# Patient Record
Sex: FEMALE | Race: WHITE | NOT HISPANIC OR LATINO | Employment: FULL TIME | URBAN - METROPOLITAN AREA
[De-identification: names, ages, dates, MRNs, and addresses within clinical notes are randomized per-mention and may not be internally consistent; named-entity substitution may affect disease eponyms.]

---

## 2017-02-15 NOTE — PROGRESS NOTES
SUBJECTIVE:     CC: Tri Mejia is an 46 year old woman who presents for preventive health visit.     Healthy Habits:    Do you get at least three servings of calcium containing foods daily (dairy, green leafy vegetables, etc.)? yes    Amount of exercise or daily activities, outside of work: 7 day(s) per week    Problems taking medications regularly No    Medication side effects: No    Have you had an eye exam in the past two years? yes    Do you see a dentist twice per year? yes    Do you have sleep apnea, excessive snoring or daytime drowsiness?no        cheek pain; She reports she has been having some pain around the sinuses, she was initially having tearing of the eyes , saw the  doctor , also had some dental procedure last week , but this more on her cheek      Asthma has been doing well , no concerns , request refill     Today's PHQ-2 Score:   PHQ-2 ( 1999 [employer]) 8/1/2016 2/16/2016   Q1: Little interest or pleasure in doing things 0 0   Q2: Feeling down, depressed or hopeless 0 0   PHQ-2 Score 0 0       Abuse: Current or Past(Physical, Sexual or Emotional)- No  Do you feel safe in your environment - Yes    Social History   Substance Use Topics     Smoking status: Former Smoker     Quit date: 4/26/2005     Smokeless tobacco: Never Used     Alcohol use Yes      Comment:  a couple of beers weeklly     The patient does not drink >3 drinks per day nor >7 drinks per week.    Recent Labs   Lab Test  04/29/15   0857  08/26/13   0848   CHOL  216*  208*   HDL  60  53   LDL  142*  144*   TRIG  71  56   CHOLHDLRATIO  3.6  4.0       Reviewed orders with patient.  Reviewed health maintenance and updated orders accordingly - Yes    Mammo Decision Support:  Patient under age 50, mutual decision reflected in health maintenance.      Pertinent mammograms are reviewed under the imaging tab.  History of abnormal Pap smear:   NO - age 30-65 PAP every 5 years with negative HPV co-testing recommended  Last 3 Pap  Results:   PAP (no units)   Date Value   04/29/2015 NIL   03/20/2012 NIL   03/25/2011 NIL     All Histories reviewed and updated in Epic.  Past Medical History   Diagnosis Date     Allergic rhinitis, cause unspecified      Seasonal     Depressive disorder, not elsewhere classified      PVC (premature ventricular contraction)      RECTAL & ANAL HEMORRHAGE 5/10/2007     Unspecified asthma(493.90) 1995      Past Surgical History   Procedure Laterality Date     Hc removal of tonsils,<11 y/o  1976     Tonsils <12y.o.     Hc hysteroscopy w endometrial bx/polypectomy w/wo d&c  1/19/2007     Hysteroscopy, D&C     Colonoscopy  05/21/2007     Sm internal hemorrhoids, otherwise normal. Repeat age 50.     Esophagoscopy, gastroscopy, duodenoscopy (egd), combined  10/3/2011     Procedure:COMBINED ESOPHAGOSCOPY, GASTROSCOPY, DUODENOSCOPY (EGD), BIOPSY SINGLE OR MULTIPLE; esophagogastroduodenoscopy with biopsy and dilatation, MULTIPLE BX; Surgeon:CHARY MORAN; [facility]     Esophagoscopy, gastroscopy, duodenoscopy (egd), dilatation, combined  10/3/2011     Procedure:COMBINED ESOPHAGOSCOPY, GASTROSCOPY, DUODENOSCOPY (EGD), DILATATION; Surgeon:CHARY MORAN; Location:[facility]       ROS:      Problem list, Medication list, Allergies, and Medical/Social/Surgical histories reviewed in EPIC and updated as appropriate.  Labs reviewed in EPIC  BP Readings from Last 3 Encounters:   02/21/17 116/72   08/01/16 104/60   03/08/16 107/62    Wt Readings from Last 3 Encounters:   02/21/17 146 lb 12.8 oz (66.6 kg)   08/01/16 146 lb (66.2 kg)   02/16/16 146 lb 12.8 oz (66.6 kg)                  Patient Active Problem List   Diagnosis     Allergic rhinitis     Palpitations     Excessive or frequent menstruation     Hemorrhage of rectum and anus     CARDIOVASCULAR SCREENING; LDL GOAL LESS THAN 160     GERD (gastroesophageal reflux disease)     Intermittent asthma     Pelvic pain in female     Past Surgical History   Procedure Laterality  Date     Hc removal of tonsils,<11 y/o  1976     Tonsils <12y.o.     Hc hysteroscopy w endometrial bx/polypectomy w/wo d&c  2007     Hysteroscopy, D&C     Colonoscopy  2007     Sm internal hemorrhoids, otherwise normal. Repeat age 50.     Esophagoscopy, gastroscopy, duodenoscopy (egd), combined  10/3/2011     Procedure:COMBINED ESOPHAGOSCOPY, GASTROSCOPY, DUODENOSCOPY (EGD), BIOPSY SINGLE OR MULTIPLE; esophagogastroduodenoscopy with biopsy and dilatation, MULTIPLE BX; Surgeon:CHARY MORAN; Location[facility]     Esophagoscopy, gastroscopy, duodenoscopy (egd), dilatation, combined  10/3/2011     Procedure:COMBINED ESOPHAGOSCOPY, GASTROSCOPY, DUODENOSCOPY (EGD), DILATATION; Surgeon:CHARY MORAN; Location[facility]       Social History   Substance Use Topics     Smoking status: Former Smoker     Quit date: 2005     Smokeless tobacco: Never Used     Alcohol use Yes      Comment:  a couple of beers weeklly     Family History   Problem Relation Age of Onset     Lipids Mother      Connective Tissue Disorder Mother      osteopenia     Hypertension Father      Arthritis Father      Lipids Father      CEREBROVASCULAR DISEASE Paternal Grandmother      CEREBROVASCULAR DISEASE Paternal Grandfather      Depression Sister      Depression Brother      Lipids Sister      C.A.D. Sister       of MI at 42. obese and developmental delay         Current Outpatient Prescriptions   Medication Sig Dispense Refill     albuterol (PROAIR HFA/PROVENTIL HFA/VENTOLIN HFA) 108 (90 BASE) MCG/ACT Inhaler Inhale 2 puffs into the lungs every 6 hours as needed for shortness of breath / dyspnea or wheezing 1 Inhaler 11     fluticasone (FLONASE) 50 MCG/ACT spray Spray 1-2 sprays into both nostrils daily 16 g 11     Cetirizine HCl (ZYRTEC PO)        Cholecalciferol (VITAMIN D3 PO) Take by mouth daily       Omeprazole (PRILOSEC PO) Take by mouth daily       Multiple Vitamin (MULTI-VITAMIN DAILY PO) Take  by mouth.        "[DISCONTINUED] albuterol (PROAIR HFA, PROVENTIL HFA, VENTOLIN HFA) 108 (90 BASE) MCG/ACT inhaler Inhale 2 puffs into the lungs every 6 hours as needed for shortness of breath / dyspnea or wheezing (Patient not taking: Reported on 2/21/2017) 1 Inhaler 11     Allergies   Allergen Reactions     Sulfa Drugs Hives     bactrim     OBJECTIVE:     /72 (BP Location: Right arm)  Pulse 82  Temp 98.7  F (37.1  C) (Temporal)  Resp 14  Ht 5' 1.75\" (1.568 m)  Wt 146 lb 12.8 oz (66.6 kg)  LMP 02/07/2017 (Approximate)  Breastfeeding? No  BMI 27.07 kg/m2  EXAM:  GENERAL: healthy, alert and no distress  EYES: Eyes grossly normal to inspection, PERRL and conjunctivae and sclerae normal  HENT: ear canals and TM's normal, nose and mouth without ulcers or lesions  NECK: no adenopathy, no asymmetry, masses, or scars and thyroid normal to palpation  RESP: lungs clear to auscultation - no rales, rhonchi or wheezes  BREAST: normal without masses, tenderness or nipple discharge and no palpable axillary masses or adenopathy  CV: regular rate and rhythm, normal S1 S2, no S3 or S4, no murmur, click or rub, no peripheral edema and peripheral pulses strong  ABDOMEN: soft, nontender, no hepatosplenomegaly, no masses and bowel sounds normal   (female): normal female external genitalia, normal urethral meatus, vaginal mucosa pink, moist, well rugated, and normal cervix/adnexa/uterus without masses or discharge  MS: no gross musculoskeletal defects noted, no edema  SKIN: no suspicious lesions or rashes  NEURO: Normal strength and tone, mentation intact and speech normal  PSYCH: mentation appears normal, affect normal/bright    ASSESSMENT/PLAN:         ICD-10-CM    1. Routine general medical examination at a health care facility Z00.00 Lipid panel reflex to direct LDL     GLUCOSE     Hemoglobin A1c   2. Intermittent asthma, uncomplicated J45.20 albuterol (PROAIR HFA/PROVENTIL HFA/VENTOLIN HFA) 108 (90 BASE) MCG/ACT Inhaler   3. Dysfunction of " "eustachian tube, bilateral H69.83 fluticasone (FLONASE) 50 MCG/ACT spray   4. Encounter for screening mammogram for breast cancer Z12.31 *MA Screening Digital Bilateral     Discussed with patient , suspects she may be grinding her teeth, will recommend trail of mouth guard   COUNSELING:   Reviewed preventive health counseling, as reflected in patient instructions         reports that she quit smoking about 11 years ago. She has never used smokeless tobacco.    Estimated body mass index is 27.07 kg/(m^2) as calculated from the following:    Height as of this encounter: 5' 1.75\" (1.568 m).    Weight as of this encounter: 146 lb 12.8 oz (66.6 kg).       Counseling Resources:  ATP IV Guidelines  Pooled Cohorts Equation Calculator  Breast Cancer Risk Calculator  FRAX Risk Assessment  ICSI Preventive Guidelines  Dietary Guidelines for Americans, 2010  USDA's MyPlate  ASA Prophylaxis  Lung CA Screening    Shyann West MD, MD  [facility]  "

## 2017-02-21 ENCOUNTER — OFFICE VISIT (OUTPATIENT)
Dept: FAMILY MEDICINE | Facility: OTHER | Age: 47
End: 2017-02-21
Payer: COMMERCIAL

## 2017-02-21 VITALS
TEMPERATURE: 98.7 F | HEIGHT: 62 IN | RESPIRATION RATE: 14 BRPM | BODY MASS INDEX: 27.02 KG/M2 | SYSTOLIC BLOOD PRESSURE: 116 MMHG | DIASTOLIC BLOOD PRESSURE: 72 MMHG | HEART RATE: 82 BPM | WEIGHT: 146.8 LBS

## 2017-02-21 DIAGNOSIS — Z00.00 ROUTINE GENERAL MEDICAL EXAMINATION AT A HEALTH CARE FACILITY: Primary | ICD-10-CM

## 2017-02-21 DIAGNOSIS — Z12.31 ENCOUNTER FOR SCREENING MAMMOGRAM FOR BREAST CANCER: ICD-10-CM

## 2017-02-21 DIAGNOSIS — H69.93 DYSFUNCTION OF EUSTACHIAN TUBE, BILATERAL: ICD-10-CM

## 2017-02-21 DIAGNOSIS — J45.20 INTERMITTENT ASTHMA, UNCOMPLICATED: ICD-10-CM

## 2017-02-21 LAB
CHOLEST SERPL-MCNC: 205 MG/DL
GLUCOSE SERPL-MCNC: 100 MG/DL (ref 70–99)
HBA1C MFR BLD: 5.4 % (ref 4.3–6)
HDLC SERPL-MCNC: 55 MG/DL
LDLC SERPL CALC-MCNC: 129 MG/DL
NONHDLC SERPL-MCNC: 150 MG/DL
TRIGL SERPL-MCNC: 106 MG/DL

## 2017-02-21 PROCEDURE — 80061 LIPID PANEL: CPT | Performed by: FAMILY MEDICINE

## 2017-02-21 PROCEDURE — 83036 HEMOGLOBIN GLYCOSYLATED A1C: CPT | Performed by: FAMILY MEDICINE

## 2017-02-21 PROCEDURE — 99396 PREV VISIT EST AGE 40-64: CPT | Performed by: FAMILY MEDICINE

## 2017-02-21 PROCEDURE — 36415 COLL VENOUS BLD VENIPUNCTURE: CPT | Performed by: FAMILY MEDICINE

## 2017-02-21 PROCEDURE — 82947 ASSAY GLUCOSE BLOOD QUANT: CPT | Performed by: FAMILY MEDICINE

## 2017-02-21 RX ORDER — ALBUTEROL SULFATE 90 UG/1
2 AEROSOL, METERED RESPIRATORY (INHALATION) EVERY 6 HOURS PRN
Qty: 1 INHALER | Refills: 11 | Status: SHIPPED | OUTPATIENT
Start: 2017-02-21 | End: 2018-03-21

## 2017-02-21 RX ORDER — FLUTICASONE PROPIONATE 50 MCG
1-2 SPRAY, SUSPENSION (ML) NASAL DAILY
Qty: 16 G | Refills: 11 | Status: SHIPPED | OUTPATIENT
Start: 2017-02-21 | End: 2018-10-09

## 2017-02-21 ASSESSMENT — PAIN SCALES - GENERAL: PAINLEVEL: MILD PAIN (2)

## 2017-02-21 NOTE — LETTER
"[facility]  [address] [address] [address]  Phone: [phone]    February 22, 2017    Tri Mejia  [id number] [facility] [address]          Dear Ms. Mejia,    I am writing to inform you of the results of the laboratory tests you had done recently.   Results for orders placed or performed in visit on 02/21/17   Lipid panel reflex to direct LDL   Result Value Ref Range    Cholesterol 205 (H) <200 mg/dL    Triglycerides 106 <150 mg/dL    HDL Cholesterol 55 >49 mg/dL    LDL Cholesterol Calculated 129 (H) <100 mg/dL    Non HDL Cholesterol 150 (H) <130 mg/dL   GLUCOSE   Result Value Ref Range    Glucose 100 (H) 70 - 99 mg/dL   Hemoglobin A1c   Result Value Ref Range    Hemoglobin A1C 5.4 4.3 - 6.0 %       HDL is the \"good\" cholesterol and when it is high (over 60), it decreases the risk for heart attack and stroke. When the HDL is less than 40, it increases the risk for these problems. The HDL can be raised by regular exercise and sometimes medications, such as niacin.    LDL is the \"bad\" cholesterol linked to heart disease and stroke. For those who have heart disease or diabetes, their LDL should be less than 100. For most everyone else, the LDL should be less than 130. For those with no risk factors, under 160 is acceptable.    Your triglycerides should be less than 150. These can be lowered with a low fat diet, reducing alcohol use, losing weight and, for those with diabetes, by improving blood sugar control.    Slight variations and daily fluctuations are normal and should cause little concern. As you know, an elevated cholesterol is one factor in increasing your risk of heart disease or stroke. You can improve your cholesterol by controlling the amount and type of fat you eat and by increasing your daily activity level.    Based on these results     Please follow these recommendations:  Please start or continue with a low fat diet ( low cholesterol)  Begin or continue " "a regular aerobic exercise program.  Increase frequency of exercise. ( This is the BEST way to increase \"good\" / HDL portion of cholesterol)  Your glucose level is slightly above goal. Can try to prevent this from getting worse by exercising regularly and controlling weight and diet.    Sincerely,      Shyann West MD            "

## 2017-02-21 NOTE — NURSING NOTE
"Chief Complaint   Patient presents with     Physical     Panel Management     MyChart, Flu shot, Lipids, aap, act       Initial /72 (BP Location: Right arm)  Pulse 82  Temp 98.7  F (37.1  C) (Temporal)  Resp 14  Ht 5' 1.75\" (1.568 m)  Wt 146 lb 12.8 oz (66.6 kg)  LMP 02/07/2017 (Approximate)  Breastfeeding? No  BMI 27.07 kg/m2 Estimated body mass index is 27.07 kg/(m^2) as calculated from the following:    Height as of this encounter: 5' 1.75\" (1.568 m).    Weight as of this encounter: 146 lb 12.8 oz (66.6 kg).  Medication Reconciliation: complete     Shelli Kaur MA    "

## 2017-02-21 NOTE — MR AVS SNAPSHOT
After Visit Summary   2/21/2017    Tri Mejia    MRN: [id number]           Patient Information     Date Of Birth          1970        Visit Information        Provider Department      2/21/2017 7:40 AM Shyann West MD [facility]        Today's Diagnoses     Routine general medical examination at a health care facility    -  1    Intermittent asthma, uncomplicated        Dysfunction of eustachian tube, bilateral          Care Instructions      Preventive Health Recommendations  Female Ages 40 to 49    Yearly exam:     See your health care provider every year in order to  1. Review health changes.   2. Discuss preventive care.    3. Review your medicines if your doctor prescribed any.      Get a Pap test every three years (unless you have an abnormal result and your provider advises testing more often).      If you get Pap tests with HPV test, you only need to test every 5 years, unless you have an abnormal result. You do not need a Pap test if your uterus was removed (hysterectomy) and you have not had cancer.      You should be tested each year for STDs (sexually transmitted diseases), if you're at risk.       Ask your doctor if you should have a mammogram.      Have a colonoscopy (test for colon cancer) if someone in your family has had colon cancer or polyps before age 50.       Have a cholesterol test every 5 years.       Have a diabetes test (fasting glucose) after age 45. If you are at risk for diabetes, you should have this test every 3 years.    Shots: Get a flu shot each year. Get a tetanus shot every 10 years.     Nutrition:     Eat at least 5 servings of fruits and vegetables each day.    Eat whole-grain bread, whole-wheat pasta and brown rice instead of white grains and rice.    Talk to your provider about Calcium and Vitamin D.     Lifestyle    Exercise at least 150 minutes a week (an average of 30 minutes a day, 5 days a week). This will help you  "control your weight and prevent disease.    Limit alcohol to one drink per day.    No smoking.     Wear sunscreen to prevent skin cancer.    See your dentist every six months for an exam and cleaning.        Follow-ups after your visit        Who to contact     If you have questions or need follow up information about today's clinic visit or your schedule please contact [facility] directly at [phone].  Normal or non-critical lab and imaging results will be communicated to you by MyChart, letter or phone within 4 business days after the clinic has received the results. If you do not hear from us within 7 days, please contact the clinic through IM5hart or phone. If you have a critical or abnormal lab result, we will notify you by phone as soon as possible.  Submit refill requests through ClickDelivery or call your pharmacy and they will forward the refill request to us. Please allow 3 business days for your refill to be completed.          Additional Information About Your Visit        [email or web address] Information     ClickDelivery lets you send messages to your doctor, view your test results, renew your prescriptions, schedule appointments and more. To sign up, go to [email or web address] . Click on \"Log in\" on the left side of the screen, which will take you to the Welcome page. Then click on \"Sign up Now\" on the right side of the page.     You will be asked to enter the access code listed below, as well as some personal information. Please follow the directions to create your username and password.     Your access code is: [email or web address]  Expires: 2017  8:02 AM     Your access code will  in 90 days. If you need help or a new code, please call your [place] clinic or [phone].        Care EveryWhere ID     This is your Care EveryWhere ID. This could be used by other organizations to access your [place] medical records  [id number]        Your Vitals Were     Pulse Temperature Respirations Height " "Last Period Breastfeeding?    82 98.7  F (37.1  C) (Temporal) 14 5' 1.75\" (1.568 m) 02/07/2017 (Approximate) No    BMI (Body Mass Index)                   27.07 kg/m2            Blood Pressure from Last 3 Encounters:   02/21/17 116/72   08/01/16 104/60   03/08/16 107/62    Weight from Last 3 Encounters:   02/21/17 146 lb 12.8 oz (66.6 kg)   08/01/16 146 lb (66.2 kg)   02/16/16 146 lb 12.8 oz (66.6 kg)              We Performed the Following     Asthma Action Plan (AAP)     GLUCOSE     Hemoglobin A1c     Lipid panel reflex to direct LDL          Where to get your medicines      These medications were sent to [id number] - [place], [place] - [address] [address]  [address] [address] [place] [place] [facility] [address]     Phone:  [phone]     albuterol 108 (90 BASE) MCG/ACT Inhaler    fluticasone 50 MCG/ACT spray          Primary Care Provider Office Phone # Fax #    Shyann West [phone]       [facility] [address] [place] [address]        Thank you!     Thank you for choosing [facility]  for your care. Our goal is always to provide you with excellent care. Hearing back from our patients is one way we can continue to improve our services. Please take a few minutes to complete the written survey that you may receive in the mail after your visit with us. Thank you!             Your Updated Medication List - Protect others around you: Learn how to safely use, store and throw away your medicines at www.disposemymeds.org.          This list is accurate as of: 2/21/17  8:02 AM.  Always use your most recent med list.                   Brand Name Dispense Instructions for use    albuterol 108 (90 BASE) MCG/ACT Inhaler    PROAIR HFA/PROVENTIL HFA/VENTOLIN HFA    1 Inhaler    Inhale 2 puffs into the lungs every 6 hours as needed for shortness of breath / dyspnea or wheezing       fluticasone 50 MCG/ACT spray    FLONASE    16 g    Spray 1-2 sprays into both nostrils daily    "    MULTI-VITAMIN DAILY PO      Take  by mouth.       PRILOSEC PO      Take by mouth daily       VITAMIN D3 PO      Take by mouth daily       ZYRTEC PO

## 2017-02-22 ASSESSMENT — ASTHMA QUESTIONNAIRES: ACT_TOTALSCORE: 23

## 2017-03-07 ENCOUNTER — HOSPITAL ENCOUNTER (OUTPATIENT)
Dept: MAMMOGRAPHY | Facility: CLINIC | Age: 47
Discharge: HOME | End: 2017-03-07
Attending: FAMILY MEDICINE | Admitting: FAMILY MEDICINE
Payer: COMMERCIAL

## 2017-03-07 DIAGNOSIS — Z12.31 VISIT FOR SCREENING MAMMOGRAM: ICD-10-CM

## 2017-03-07 PROCEDURE — G0202 SCR MAMMO BI INCL CAD: HCPCS

## 2017-07-16 ENCOUNTER — OFFICE VISIT (OUTPATIENT)
Dept: URGENT CARE | Facility: RETAIL CLINIC | Age: 47
End: 2017-07-16
Payer: COMMERCIAL

## 2017-07-16 VITALS
WEIGHT: 145 LBS | DIASTOLIC BLOOD PRESSURE: 77 MMHG | BODY MASS INDEX: 26.74 KG/M2 | OXYGEN SATURATION: 97 % | SYSTOLIC BLOOD PRESSURE: 129 MMHG | RESPIRATION RATE: 12 BRPM | TEMPERATURE: 97.8 F | HEART RATE: 60 BPM

## 2017-07-16 DIAGNOSIS — H65.02 ACUTE SEROUS OTITIS MEDIA OF LEFT EAR, RECURRENCE NOT SPECIFIED: Primary | ICD-10-CM

## 2017-07-16 PROCEDURE — 99213 OFFICE O/P EST LOW 20 MIN: CPT | Performed by: PHYSICIAN ASSISTANT

## 2017-07-16 ASSESSMENT — PAIN SCALES - GENERAL: PAINLEVEL: MODERATE PAIN (5)

## 2017-07-16 NOTE — MR AVS SNAPSHOT
"              After Visit Summary   2017    Tri Mejia    MRN: [id number]           Patient Information     Date Of Birth          1970        Visit Information        Provider Department      2017 12:50 PM Violette Yeboah PA-C [facility]        Today's Diagnoses     Acute serous otitis media of left ear, recurrence not specified    -  1      Care Instructions    Continue Zyrtec daily.  Use Flonase as directed- 2 sprays in each nostril daily until symptoms resolve then continue with 1 spray in each nostril daily for 5 more days.  Sudafed behind the pharmacist counter helps to dry up fluid in ears.  Ibuprofen as needed for pain relief and to reduce inflammation.  Warm compresses next to ear for pain relief.  Drink plenty of fluids and place a humidifier in bedroom.  +/- Mucinex (guiafenasin) to help fluid in ears to drain.  Follow up with primary care provider in 10-14 days with any concern of persistent pain.          Follow-ups after your visit        Who to contact     You can reach your care team any time of the day by calling [phone].  Notification of test results:  If you have an abnormal lab result, we will notify you by phone as soon as possible.         Additional Information About Your Visit        MyChart Information     [employer] lets you send messages to your doctor, view your test results, renew your prescriptions, schedule appointments and more. To sign up, go to [email or web address] . Click on \"Log in\" on the left side of the screen, which will take you to the Welcome page. Then click on \"Sign up Now\" on the right side of the page.     You will be asked to enter the access code listed below, as well as some personal information. Please follow the directions to create your username and password.     Your access code is: [email or web address]  Expires: 10/14/2017 12:51 PM     Your access code will  in 90 days. If you need help or a new code, please " call your [facility] or [phone].        Care EveryWhere ID     This is your Care EveryWhere ID. This could be used by other organizations to access your [place] medical records  [id number]        Your Vitals Were     Pulse Temperature Respirations Pulse Oximetry BMI (Body Mass Index)       60 97.8  F (36.6  C) (Tympanic) 12 97% 26.74 kg/m2        Blood Pressure from Last 3 Encounters:   07/16/17 129/77   02/21/17 116/72   08/01/16 104/60    Weight from Last 3 Encounters:   07/16/17 145 lb (65.8 kg)   02/21/17 146 lb 12.8 oz (66.6 kg)   08/01/16 146 lb (66.2 kg)              Today, you had the following     No orders found for display       Primary Care Provider Office Phone # Fax #    Shyann Lynne West [phone]       [facility] [address] [place] [address]        Equal Access to Services     [facility]: Hadii aad ku hadasho Soomaali, waaxda luqadaha, qaybta kaalmada adeegyada, waxay idiin hayevern eliecer pinto . So [facility] [phone].    ATENCIÓN: Si habla español, tiene a renteria disposición servicios gratuitos de asistencia lingüística. Llame al [phone].    We comply with applicable federal civil rights laws and [place] laws. We do not discriminate on the basis of race, color, national origin, age, disability sex, sexual orientation or gender identity.            Thank you!     Thank you for choosing [facility]  for your care. Our goal is always to provide you with excellent care. Hearing back from our patients is one way we can continue to improve our services. Please take a few minutes to complete the written survey that you may receive in the mail after your visit with us. Thank you!             Your Updated Medication List - Protect others around you: Learn how to safely use, store and throw away your medicines at www.disposemymeds.org.          This list is accurate as of: 7/16/17 12:51 PM.  Always use your most recent med  list.                   Brand Name Dispense Instructions for use Diagnosis    albuterol 108 (90 BASE) MCG/ACT Inhaler    PROAIR HFA/PROVENTIL HFA/VENTOLIN HFA    1 Inhaler    Inhale 2 puffs into the lungs every 6 hours as needed for shortness of breath / dyspnea or wheezing    Intermittent asthma, uncomplicated       fluticasone 50 MCG/ACT spray    FLONASE    16 g    Spray 1-2 sprays into both nostrils daily    Dysfunction of eustachian tube, bilateral       MULTI-VITAMIN DAILY PO      Take  by mouth.        PRILOSEC PO      Take by mouth daily        VITAMIN D3 PO      Take by mouth daily        ZYRTEC PO

## 2017-07-16 NOTE — NURSING NOTE
"Chief Complaint   Patient presents with     Ear Problem     Left ear for about a week now       Initial /77  Pulse 60  Temp 97.8  F (36.6  C) (Tympanic)  Resp 12  Wt 145 lb (65.8 kg)  SpO2 97%  BMI 26.74 kg/m2 Estimated body mass index is 26.74 kg/(m^2) as calculated from the following:    Height as of 2/21/17: 5' 1.75\" (1.568 m).    Weight as of this encounter: 145 lb (65.8 kg).  Medication Reconciliation: complete    "

## 2017-07-16 NOTE — PROGRESS NOTES
Chief Complaint   Patient presents with     Ear Problem     Left ear for about a week now     SUBJECTIVE:  Tri Mejia is a 47 year old female who presents for evaluation of left ear pain and fullness for 1 week.   Severity: mild and moderate   Timing: gradual onset  Additional symptoms include none.  Treatment measures tried include: OTC meds.  History of recurrent otitis: no  Predisposing factors include: None.    Past Medical History:   Diagnosis Date     Allergic rhinitis, cause unspecified     Seasonal     Depressive disorder, not elsewhere classified      PVC (premature ventricular contraction)      RECTAL & ANAL HEMORRHAGE 5/10/2007     Unspecified asthma(493.90) 1995     Current Outpatient Prescriptions   Medication Sig Dispense Refill     albuterol (PROAIR HFA/PROVENTIL HFA/VENTOLIN HFA) 108 (90 BASE) MCG/ACT Inhaler Inhale 2 puffs into the lungs every 6 hours as needed for shortness of breath / dyspnea or wheezing 1 Inhaler 11     fluticasone (FLONASE) 50 MCG/ACT spray Spray 1-2 sprays into both nostrils daily 16 g 11     Cetirizine HCl (ZYRTEC PO)        Cholecalciferol (VITAMIN D3 PO) Take by mouth daily       Omeprazole (PRILOSEC PO) Take by mouth daily       Multiple Vitamin (MULTI-VITAMIN DAILY PO) Take  by mouth.       Social History   Substance Use Topics     Smoking status: Former Smoker     Quit date: 4/26/2005     Smokeless tobacco: Never Used     Alcohol use Yes      Comment:  a couple of beers weeklly     Allergies   Allergen Reactions     Sulfa Drugs Hives     bactrim     ROS:   Review of systems negative except as stated above.    OBJECTIVE:  /77  Pulse 60  Temp 97.8  F (36.6  C) (Tympanic)  Resp 12  Wt 145 lb (65.8 kg)  SpO2 97%  BMI 26.74 kg/m2   GENERAL: awake alert and in no acute distress  EARS:   Right TM in neutral position, translucent without scarring, effusion or erythema. Normal landmarks are visible. Auditory canal without drainage, edema, erythema.  ENT: EOMI,   PERRL, conjunctiva clear. Nares bilaterally without edematous turbinates or discharge. Posterior pharynx is not erythematous.  NECK: supple, non-tender to palpation, no adenopathy noted.   RESP: lungs clear to auscultation - no rales, rhonchi or wheezes  CV: regular rates and rhythm, normal S1 S2, no murmur noted    ASSESSMENT:    ICD-10-CM    1. Acute serous otitis media of left ear, recurrence not specified H65.02      PLAN:   Patient Instructions   Continue Zyrtec daily.  Use Flonase as directed- 2 sprays in each nostril daily until symptoms resolve then continue with 1 spray in each nostril daily for 5 more days.  Sudafed behind the pharmacist counter helps to dry up fluid in ears.  Ibuprofen as needed for pain relief and to reduce inflammation.  Warm compresses next to ear for pain relief.  Drink plenty of fluids and place a humidifier in bedroom.  +/- Mucinex (guiafenasin) to help fluid in ears to drain.  Follow up with primary care provider in 10-14 days with any concern of persistent pain.    Follow up with primary care provider with any problems, questions or concerns or if symptoms worsen or fail to improve. Patient agreed to plan and verbalized understanding.    Nazanin Yeboah PA-C  [facility] [place]

## 2017-07-16 NOTE — PATIENT INSTRUCTIONS
Continue Zyrtec daily.  Use Flonase as directed- 2 sprays in each nostril daily until symptoms resolve then continue with 1 spray in each nostril daily for 5 more days.  Sudafed behind the pharmacist counter helps to dry up fluid in ears.  Ibuprofen as needed for pain relief and to reduce inflammation.  Warm compresses next to ear for pain relief.  Drink plenty of fluids and place a humidifier in bedroom.  +/- Mucinex (guiafenasin) to help fluid in ears to drain.  Follow up with primary care provider in 10-14 days with any concern of persistent pain.

## 2018-03-14 NOTE — PROGRESS NOTES
SUBJECTIVE:   CC: Tri Mejia is an 47 year old woman who presents for preventive health visit.     Physical   Annual:     Getting at least 3 servings of Calcium per day::  Yes    Bi-annual eye exam::  Yes    Dental care twice a year::  Yes    Sleep apnea or symptoms of sleep apnea::  None    Diet::  Other    Frequency of exercise::  6-7 days/week    Duration of exercise::  15-30 minutes    Taking medications regularly::  Yes    Additional concerns today::  No                Today's PHQ-2 Score:   PHQ-2 ( 1999 [employer]) 3/21/2018   Q1: Little interest or pleasure in doing things 0   Q2: Feeling down, depressed or hopeless 0   PHQ-2 Score 0   Q1: Little interest or pleasure in doing things Not at all   Q2: Feeling down, depressed or hopeless Not at all   PHQ-2 Score 0       Abuse: Current or Past(Physical, Sexual or Emotional)- No  Do you feel safe in your environment - Yes    Social History   Substance Use Topics     Smoking status: Former Smoker     Quit date: 4/26/2005     Smokeless tobacco: Never Used     Alcohol use Yes      Comment:  a couple of beers weeklly     Alcohol Use 3/21/2018   If you drink alcohol do you typically have greater than 3 drinks per day OR greater than 7 drinks per week? No   No flowsheet data found.    Reviewed orders with patient.  Reviewed health maintenance and updated orders accordingly - Yes  Labs reviewed in EPIC    Patient under age 50, mutual decision reflected in health maintenance.      Pertinent mammograms are reviewed under the imaging tab.  History of abnormal Pap smear: NO - age 30-65 PAP every 5 years with negative HPV co-testing recommended    Reviewed and updated as needed this visit by clinical staff  Tobacco  Allergies  Med Hx  Surg Hx  Fam Hx  Soc Hx        Reviewed and updated as needed this visit by Provider        Past Medical History:   Diagnosis Date     Allergic rhinitis, cause unspecified     Seasonal     Depressive disorder, not elsewhere  classified      PVC (premature ventricular contraction)      RECTAL & ANAL HEMORRHAGE 5/10/2007     Unspecified asthma(493.90) 1995      Past Surgical History:   Procedure Laterality Date     COLONOSCOPY  05/21/2007    Sm internal hemorrhoids, otherwise normal. Repeat age 50.     ESOPHAGOSCOPY, GASTROSCOPY, DUODENOSCOPY (EGD), COMBINED  10/3/2011    Procedure:COMBINED ESOPHAGOSCOPY, GASTROSCOPY, DUODENOSCOPY (EGD), BIOPSY SINGLE OR MULTIPLE; esophagogastroduodenoscopy with biopsy and dilatation, MULTIPLE BX; Surgeon:CHARY MORAN; Location:[facility]     ESOPHAGOSCOPY, GASTROSCOPY, DUODENOSCOPY (EGD), DILATATION, COMBINED  10/3/2011    Procedure:COMBINED ESOPHAGOSCOPY, GASTROSCOPY, DUODENOSCOPY (EGD), DILATATION; Surgeon:CHARY MORAN; Location:[facility]     HC HYSTEROSCOPY W ENDOMETRIAL BX/POLYPECTOMY W/WO D&C  1/19/2007    Hysteroscopy, D&C     HC REMOVAL OF TONSILS,<11 Y/O  1976    Tonsils <12y.o.       Review of Systems  C: NEGATIVE for fever, chills, change in weight  I: NEGATIVE for worrisome rashes, moles or lesions  E: NEGATIVE for vision changes or irritation  ENT: NEGATIVE for ear, mouth and throat problems  R: NEGATIVE for significant cough or SOB  B: NEGATIVE for masses, tenderness or discharge  CV: NEGATIVE for chest pain, palpitations or peripheral edema  GI: NEGATIVE for nausea, abdominal pain, heartburn, or change in bowel habits  : NEGATIVE for unusual urinary or vaginal symptoms. Periods are regular.  M: NEGATIVE for significant arthralgias or myalgia  N: NEGATIVE for weakness, dizziness or paresthesias  P: NEGATIVE for changes in mood or affect     OBJECTIVE:   There were no vitals taken for this visit.  Physical Exam  GENERAL: healthy, alert and no distress  EYES: Eyes grossly normal to inspection, PERRL and conjunctivae and sclerae normal  HENT: ear canals and TM's normal, nose and mouth without ulcers or lesions  NECK: no adenopathy, no asymmetry, masses, or scars and thyroid normal to  "palpation  RESP: lungs clear to auscultation - no rales, rhonchi or wheezes  CV: regular rate and rhythm, normal S1 S2, no S3 or S4, no murmur, click or rub, no peripheral edema and peripheral pulses strong  ABDOMEN: soft, nontender, no hepatosplenomegaly, no masses and bowel sounds normal  MS: no gross musculoskeletal defects noted, no edema  SKIN: no suspicious lesions or rashes  NEURO: Normal strength and tone, mentation intact and speech normal  PSYCH: mentation appears normal, affect normal/bright    ASSESSMENT/PLAN:   1. Encounter for routine adult health examination without abnormal findings  See notes    2. Intermittent asthma, uncomplicated  Stable. Refills sent.  - albuterol (PROAIR HFA/PROVENTIL HFA/VENTOLIN HFA) 108 (90 BASE) MCG/ACT Inhaler; Inhale 2 puffs into the lungs every 6 hours as needed for shortness of breath / dyspnea or wheezing  Dispense: 1 Inhaler; Refill: 11    3. Visit for screening mammogram  - MA SCREENING DIGITAL BILAT - Future  (s+30); Future    COUNSELING:  Reviewed preventive health counseling, as reflected in patient instructions       Regular exercise       Healthy diet/nutrition         reports that she quit smoking about 12 years ago. She has never used smokeless tobacco.    Estimated body mass index is 26.74 kg/(m^2) as calculated from the following:    Height as of 2/21/17: 5' 1.75\" (1.568 m).    Weight as of 7/16/17: 145 lb (65.8 kg).   Weight management plan: Discussed healthy diet and exercise guidelines and patient will follow up in 12 months in clinic to re-evaluate.    Counseling Resources:  ATP IV Guidelines  Pooled Cohorts Equation Calculator  Breast Cancer Risk Calculator  FRAX Risk Assessment  ICSI Preventive Guidelines  Dietary Guidelines for Americans, 2010  USDA's MyPlate  ASA Prophylaxis  Lung CA Screening    Suad Dang, ROMELIA [facility]  "

## 2018-03-21 ENCOUNTER — OFFICE VISIT (OUTPATIENT)
Dept: FAMILY MEDICINE | Facility: OTHER | Age: 48
End: 2018-03-21
Payer: COMMERCIAL

## 2018-03-21 VITALS
SYSTOLIC BLOOD PRESSURE: 106 MMHG | HEIGHT: 62 IN | BODY MASS INDEX: 26.7 KG/M2 | RESPIRATION RATE: 16 BRPM | HEART RATE: 56 BPM | TEMPERATURE: 98 F | DIASTOLIC BLOOD PRESSURE: 66 MMHG | WEIGHT: 145.1 LBS

## 2018-03-21 DIAGNOSIS — Z12.31 VISIT FOR SCREENING MAMMOGRAM: ICD-10-CM

## 2018-03-21 DIAGNOSIS — Z00.00 ENCOUNTER FOR ROUTINE ADULT HEALTH EXAMINATION WITHOUT ABNORMAL FINDINGS: Primary | ICD-10-CM

## 2018-03-21 DIAGNOSIS — J45.20 INTERMITTENT ASTHMA, UNCOMPLICATED: ICD-10-CM

## 2018-03-21 PROCEDURE — 99396 PREV VISIT EST AGE 40-64: CPT | Performed by: STUDENT IN AN ORGANIZED HEALTH CARE EDUCATION/TRAINING PROGRAM

## 2018-03-21 RX ORDER — ALBUTEROL SULFATE 90 UG/1
2 AEROSOL, METERED RESPIRATORY (INHALATION) EVERY 6 HOURS PRN
Qty: 1 INHALER | Refills: 11 | Status: SHIPPED | OUTPATIENT
Start: 2018-03-21 | End: 2019-04-01

## 2018-03-21 ASSESSMENT — PAIN SCALES - GENERAL: PAINLEVEL: NO PAIN (0)

## 2018-03-21 NOTE — MR AVS SNAPSHOT
After Visit Summary   3/21/2018    Tri Mejia    MRN: [id number]           Patient Information     Date Of Birth          1970        Visit Information        Provider Department      3/21/2018 12:00 PM Suad Dang APRN [facility]        Today's Diagnoses     Mixed hyperlipidemia    -  1    Visit for screening mammogram        Need for prophylactic vaccination with tetanus-diphtheria (TD)        Intermittent asthma, uncomplicated          Care Instructions      Preventive Health Recommendations  Female Ages 40 to 49    Yearly exam:     See your health care provider every year in order to  1. Review health changes.   2. Discuss preventive care.    3. Review your medicines if your doctor prescribed any.      Get a Pap test every three years (unless you have an abnormal result and your provider advises testing more often).      If you get Pap tests with HPV test, you only need to test every 5 years, unless you have an abnormal result. You do not need a Pap test if your uterus was removed (hysterectomy) and you have not had cancer.      You should be tested each year for STDs (sexually transmitted diseases), if you're at risk.       Ask your doctor if you should have a mammogram.      Have a colonoscopy (test for colon cancer) if someone in your family has had colon cancer or polyps before age 50.       Have a cholesterol test every 5 years.       Have a diabetes test (fasting glucose) after age 45. If you are at risk for diabetes, you should have this test every 3 years.    Shots: Get a flu shot each year. Get a tetanus shot every 10 years.     Nutrition:     Eat at least 5 servings of fruits and vegetables each day.    Eat whole-grain bread, whole-wheat pasta and brown rice instead of white grains and rice.    Talk to your provider about Calcium and Vitamin D.     Lifestyle    Exercise at least 150 minutes a week (an average of 30 minutes a day, 5 days a  "week). This will help you control your weight and prevent disease.    Limit alcohol to one drink per day.    No smoking.     Wear sunscreen to prevent skin cancer.    See your dentist every six months for an exam and cleaning.          Follow-ups after your visit        Your next 10 appointments already scheduled     Mar 22, 2018 12:45 PM CDT   (Arrive by 12:30 PM)   MA SCREENING DIGITAL BILATERAL with PHMA1   [facility]    [address] [place] [id number] [phone]           Do not use any powder, lotion or deodorant under your arms or on your breast. If you do, we will ask you to remove it before your exam.  Wear comfortable, two-piece clothing.  If you have any allergies, tell your care team.  Bring any previous mammograms from other facilities or have them mailed to the breast center. Three-dimensional (3D) mammograms are available at [place] locations in [facility], [facility], and [place]. [facility] locations include [facility] in [place]. Benefits of 3D mammograms include: - Improved rate of cancer detection - Decreases your chance of having to go back for more tests, which means fewer: - \"False-positive\" results (This means that there is an abnormal area but it isn't cancer.) - Invasive testing procedures, such as a biopsy or surgery - Can provide clearer images of the breast if you have dense breast tissue. 3D mammography is an optional exam that anyone can have with a 2D mammogram. It doesn't replace or take the place of a 2D mammogram. 2D mammograms remain an effective screening test for all women.  Not all insurance companies cover the cost of a 3D mammogram. Check with your insurance.              Future tests that were ordered for you today     Open Future Orders        Priority Expected Expires Ordered    MA SCREENING DIGITAL BILAT - Future  (s+30) Routine  " "3/14/2019 3/21/2018            Who to contact     If you have questions or need follow up information about today's clinic visit or your schedule please contact [facility] directly at [phone].  Normal or non-critical lab and imaging results will be communicated to you by MyChart, letter or phone within 4 business days after the clinic has received the results. If you do not hear from us within 7 days, please contact the clinic through MyChart or phone. If you have a critical or abnormal lab result, we will notify you by phone as soon as possible.  Submit refill requests through [employer] or call your pharmacy and they will forward the refill request to us. Please allow 3 business days for your refill to be completed.          Additional Information About Your Visit        [employer] Information     [employer] lets you send messages to your doctor, view your test results, renew your prescriptions, schedule appointments and more. To sign up, go to [email or web address] . Click on \"Log in\" on the left side of the screen, which will take you to the Welcome page. Then click on \"Sign up Now\" on the right side of the page.     You will be asked to enter the access code listed below, as well as some personal information. Please follow the directions to create your username and password.     Your access code is: [email or web address]  Expires: 2018 12:23 PM     Your access code will  in 90 days. If you need help or a new code, please call your [place] clinic or [phone].        Care EveryWhere ID     This is your Care EveryWhere ID. This could be used by other organizations to access your [place] medical records  [id number]        Your Vitals Were     Pulse Temperature Respirations Height BMI (Body Mass Index)       56 98  F (36.7  C) (Temporal) 16 5' 1.73\" (1.568 m) 26.77 kg/m2        Blood Pressure from Last 3 Encounters:   18 106/66   17 129/77   17 116/72    Weight from Last 3 " Encounters:   03/21/18 145 lb 1.6 oz (65.8 kg)   07/16/17 145 lb (65.8 kg)   02/21/17 146 lb 12.8 oz (66.6 kg)              We Performed the Following     Asthma Action Plan (AAP)          Where to get your medicines      These medications were sent to [id number] - [place], [place] - [address]  [address] [address], [facility] [address]     Phone:  [phone]     albuterol 108 (90 BASE) MCG/ACT Inhaler          Primary Care Provider Office Phone # Fax #    Suad Dang, ROMELIA [facility] [phone] [address] [facility] [address]        Equal Access to Services     JOHN GOYAL : Hadii aad ku hadasho Soomaali, waaxda luqadaha, qaybta kaalmada adeegyada, maria dolores pinto . So [facility] [phone].    ATENCIÓN: Si habla español, tiene a renteria disposición servicios gratuitos de asistencia lingüística. [facility] [phone].    We comply with applicable federal civil rights laws and [place] laws. We do not discriminate on the basis of race, color, national origin, age, disability, sex, sexual orientation, or gender identity.            Thank you!     Thank you for choosing [facility]  for your care. Our goal is always to provide you with excellent care. Hearing back from our patients is one way we can continue to improve our services. Please take a few minutes to complete the written survey that you may receive in the mail after your visit with us. Thank you!             Your Updated Medication List - Protect others around you: Learn how to safely use, store and throw away your medicines at www.disposemymeds.org.          This list is accurate as of 3/21/18 12:23 PM.  Always use your most recent med list.                   Brand Name Dispense Instructions for use Diagnosis    albuterol 108 (90 BASE) MCG/ACT Inhaler    PROAIR HFA/PROVENTIL HFA/VENTOLIN HFA    1 Inhaler    Inhale 2 puffs into the lungs every 6 hours as needed for shortness of  breath / dyspnea or wheezing    Intermittent asthma, uncomplicated       fluticasone 50 MCG/ACT spray    FLONASE    16 g    Spray 1-2 sprays into both nostrils daily    Dysfunction of Eustachian tube, bilateral       MULTI-VITAMIN DAILY PO      Take  by mouth.        PRILOSEC PO      Take by mouth daily        VITAMIN D3 PO      Take by mouth daily        ZYRTEC PO

## 2018-03-22 ENCOUNTER — HOSPITAL ENCOUNTER (OUTPATIENT)
Dept: MAMMOGRAPHY | Facility: CLINIC | Age: 48
Discharge: HOME | End: 2018-03-22
Payer: COMMERCIAL

## 2018-03-22 DIAGNOSIS — Z12.31 VISIT FOR SCREENING MAMMOGRAM: ICD-10-CM

## 2018-03-22 PROCEDURE — 77063 BREAST TOMOSYNTHESIS BI: CPT

## 2018-03-22 ASSESSMENT — ASTHMA QUESTIONNAIRES: ACT_TOTALSCORE: 22

## 2018-10-08 NOTE — PROGRESS NOTES
SUBJECTIVE:   Tri Mejia is a 48 year old female who presents to clinic today for the following health issues:    HPI  URINARY TRACT SYMPTOMS  Onset: 1 week    Description:   Painful urination (Dysuria): YES  Blood in urine (Hematuria): no   Delay in urine (Hesitency): no     Intensity: mild    Progression of Symptoms:  same    Accompanying Signs & Symptoms:  Fever/chills: no   Flank pain no   Nausea and vomiting: no   Any vaginal symptoms: vaginal itching  Abdominal/Pelvic Pain: YES    History:   History of frequent UTI's: no   History of kidney stones: no   Sexually Active: no   Possibility of pregnancy: No    Precipitating factors:   None    Therapies Tried and outcome: OTC advil or tylenol     She started having symptoms over a week ago.  She had some itchiness of her bottom so he she tried some topical 1 day Monistat and felt that the symptoms worsened after she tried this.  She does have multiple allergies and sensitivities to detergents, soaps and other body care products.  She felt that her symptoms possibly had worsened due to use of Monistat and having a sensitivity to this.  She denies using any new soaps, lotions, detergents, lubricants or other personal care products. She is urinating every hour during the day.  At night she will urinate prior to bedtime and then shortly thereafter urinate one more time otherwise she will be up during the night to urinate.  She feels that after she urinates she has not completely emptied her bladder and could go  again shortly thereafter.  She is also feeling a heaviness and discomfort when she urinates.  The urinary frequency has been going on for quite some time but she has just dealt with it.  She came in today because the frequency and discomfort have been worsening over the past week.  She has not been sexually active for the past year.  She denies family history of diabetes.  Her fasting blood sugars the past couple times have been mildly elevated in  "2015 and 2017.    Problem list and histories reviewed & adjusted, as indicated.  Additional history: as documented    BP Readings from Last 3 Encounters:   10/09/18 118/64   03/21/18 106/66   07/16/17 129/77    Wt Readings from Last 3 Encounters:   10/09/18 142 lb 11.2 oz (64.7 kg)   03/21/18 145 lb 1.6 oz (65.8 kg)   07/16/17 145 lb (65.8 kg)                  Labs reviewed in EPIC    ROS:  Constitutional, HEENT, cardiovascular, pulmonary, GI, , musculoskeletal, neuro, skin, endocrine and psych systems are negative, except as otherwise noted.    OBJECTIVE:     /64  Pulse 52  Temp 98.7  F (37.1  C) (Temporal)  Resp 16  Ht 5' 1.73\" (1.568 m)  Wt 142 lb 11.2 oz (64.7 kg)  SpO2 100%  BMI 26.33 kg/m2  Body mass index is 26.33 kg/(m^2).  GENERAL:  alert and no acute distress  RESP: lungs clear to auscultation - no rales, rhonchi or wheezes  CV: regular rate and rhythm, normal S1 S2, no S3 or S4, no murmur, click or rub, no peripheral edema   ABDOMEN: soft, nontender, without hepatosplenomegaly or masses and bowel sounds normal   (female): normal female external genitalia, normal urethral meatus, vaginal mucosa, normal cervix/adnexa/uterus without masses or discharge, bladder feels distended to palpation on manual pelvic exam  MS: no gross musculoskeletal defects noted, no edema  SKIN: no suspicious lesions or rashes  NEURO: Normal strength and tone, mentation intact and speech normal  PSYCH: mentation appears normal, affect normal/bright    Diagnostic Test Results:  Results for orders placed or performed in visit on 10/09/18 (from the past 24 hour(s))   UA reflex to Microscopic and Culture   Result Value Ref Range    Color Urine Yellow     Appearance Urine Clear     Glucose Urine Negative NEG^Negative mg/dL    Bilirubin Urine Negative NEG^Negative    Ketones Urine Negative NEG^Negative mg/dL    Specific Gravity Urine <=1.005 1.003 - 1.035    Blood Urine Negative NEG^Negative    pH Urine 6.0 5.0 - 7.0 pH "    Protein Albumin Urine Negative NEG^Negative mg/dL    Urobilinogen Urine 0.2 0.2 - 1.0 EU/dL    Nitrite Urine Negative NEG^Negative    Leukocyte Esterase Urine Negative NEG^Negative    Source Unspecified Urine    Wet prep   Result Value Ref Range    Specimen Description Unspecified Urine     Wet Prep No Trichomonas seen     Wet Prep No clue cells seen     Wet Prep No yeast seen        ASSESSMENT/PLAN:     1. Incomplete bladder emptying  2. Urinary frequency  3. Dysuria  Patient's UA and wet prep are completely normal.  I did a pelvic exam which the external and internal genitalia appear normal.  Manual pelvic exam revealed distention over her bladder and patient had voided for urine sample within the past 10 minutes.  She will come in tomorrow for fasting labs to rule out diabetes given the urinary frequency however I feel the likelihood for this is probably low.  I recommended that she consult with urology for further workup.  - UA reflex to Microscopic and Culture  - Wet prep  - UROLOGY ADULT REFERRAL    4. Screening for malignant neoplasm of cervix  - Pap imaged thin layer screen with HPV - recommended age 30 - 65 years (select HPV order below)  - HPV High Risk Types DNA Cervical    5. Screening for diabetes mellitus  - **Basic metabolic panel FUTURE anytime; Future    6. Encounter for screening for cardiovascular disorders  - Lipid panel reflex to direct LDL Fasting; Future    7. Need for prophylactic vaccination and inoculation against influenza  - FLU VACCINE, SPLIT VIRUS, IM (QUADRIVALENT) [17100]- >3 YRS  - Vaccine Administration, Initial [62316]    Greater than 50% of 25 minute visit were spent on counseling or coordination of care regarding urinary frequency, dysuria, incomplete bladder emptying.     ROMELIA Aguayo [facility]

## 2018-10-09 ENCOUNTER — OFFICE VISIT (OUTPATIENT)
Dept: FAMILY MEDICINE | Facility: OTHER | Age: 48
End: 2018-10-09
Payer: COMMERCIAL

## 2018-10-09 VITALS
SYSTOLIC BLOOD PRESSURE: 118 MMHG | HEART RATE: 52 BPM | HEIGHT: 62 IN | DIASTOLIC BLOOD PRESSURE: 64 MMHG | BODY MASS INDEX: 26.26 KG/M2 | RESPIRATION RATE: 16 BRPM | WEIGHT: 142.7 LBS | OXYGEN SATURATION: 100 % | TEMPERATURE: 98.7 F

## 2018-10-09 DIAGNOSIS — Z13.1 SCREENING FOR DIABETES MELLITUS: ICD-10-CM

## 2018-10-09 DIAGNOSIS — Z23 NEED FOR PROPHYLACTIC VACCINATION AND INOCULATION AGAINST INFLUENZA: ICD-10-CM

## 2018-10-09 DIAGNOSIS — R35.0 URINARY FREQUENCY: ICD-10-CM

## 2018-10-09 DIAGNOSIS — R30.0 DYSURIA: ICD-10-CM

## 2018-10-09 DIAGNOSIS — R33.9 INCOMPLETE BLADDER EMPTYING: Primary | ICD-10-CM

## 2018-10-09 DIAGNOSIS — Z13.6 ENCOUNTER FOR SCREENING FOR CARDIOVASCULAR DISORDERS: ICD-10-CM

## 2018-10-09 DIAGNOSIS — Z12.4 SCREENING FOR MALIGNANT NEOPLASM OF CERVIX: ICD-10-CM

## 2018-10-09 LAB
ALBUMIN UR-MCNC: NEGATIVE MG/DL
APPEARANCE UR: CLEAR
BILIRUB UR QL STRIP: NEGATIVE
COLOR UR AUTO: YELLOW
GLUCOSE UR STRIP-MCNC: NEGATIVE MG/DL
HGB UR QL STRIP: NEGATIVE
KETONES UR STRIP-MCNC: NEGATIVE MG/DL
LEUKOCYTE ESTERASE UR QL STRIP: NEGATIVE
NITRATE UR QL: NEGATIVE
PH UR STRIP: 6 PH (ref 5–7)
SOURCE: NORMAL
SP GR UR STRIP: <=1.005 (ref 1–1.03)
SPECIMEN SOURCE: NORMAL
UROBILINOGEN UR STRIP-ACNC: 0.2 EU/DL (ref 0.2–1)
WET PREP SPEC: NORMAL

## 2018-10-09 PROCEDURE — 90471 IMMUNIZATION ADMIN: CPT | Performed by: STUDENT IN AN ORGANIZED HEALTH CARE EDUCATION/TRAINING PROGRAM

## 2018-10-09 PROCEDURE — 81003 URINALYSIS AUTO W/O SCOPE: CPT | Performed by: STUDENT IN AN ORGANIZED HEALTH CARE EDUCATION/TRAINING PROGRAM

## 2018-10-09 PROCEDURE — 87624 HPV HI-RISK TYP POOLED RSLT: CPT | Performed by: STUDENT IN AN ORGANIZED HEALTH CARE EDUCATION/TRAINING PROGRAM

## 2018-10-09 PROCEDURE — 99214 OFFICE O/P EST MOD 30 MIN: CPT | Mod: 25 | Performed by: STUDENT IN AN ORGANIZED HEALTH CARE EDUCATION/TRAINING PROGRAM

## 2018-10-09 PROCEDURE — 87210 SMEAR WET MOUNT SALINE/INK: CPT | Performed by: STUDENT IN AN ORGANIZED HEALTH CARE EDUCATION/TRAINING PROGRAM

## 2018-10-09 PROCEDURE — 90686 IIV4 VACC NO PRSV 0.5 ML IM: CPT | Performed by: STUDENT IN AN ORGANIZED HEALTH CARE EDUCATION/TRAINING PROGRAM

## 2018-10-09 PROCEDURE — G0145 SCR C/V CYTO,THINLAYER,RESCR: HCPCS | Performed by: STUDENT IN AN ORGANIZED HEALTH CARE EDUCATION/TRAINING PROGRAM

## 2018-10-09 RX ORDER — FLUCONAZOLE 150 MG/1
150 TABLET ORAL ONCE
Qty: 1 TABLET | Refills: 0 | Status: CANCELLED | OUTPATIENT
Start: 2018-10-09 | End: 2018-10-09

## 2018-10-09 RX ORDER — CIPROFLOXACIN 500 MG/1
500 TABLET, FILM COATED ORAL 2 TIMES DAILY
Qty: 6 TABLET | Refills: 0 | Status: CANCELLED | OUTPATIENT
Start: 2018-10-09

## 2018-10-09 ASSESSMENT — PAIN SCALES - GENERAL: PAINLEVEL: NO PAIN (0)

## 2018-10-09 NOTE — NURSING NOTE

## 2018-10-09 NOTE — LETTER
October 19, 2018    Tri Mejia  [id number] [address] [place] [id number]    Dear Tri,  We are happy to inform you that your PAP smear result from 10/9/18 is normal.  We are now able to do a follow up test on PAP smears. The DNA test is for HPV (Human Papilloma Virus). Cervical cancer is closely linked with certain types of HPV. Your results showed no evidence of high risk HPV.  Therefore we recommend you return in 5 years for your next pap smear and HPV test.  You will still need to return to the clinic every year for an annual exam and other preventive tests.  If you have additional questions regarding this result, please call our registered nurse, Vianney at [phone].  Sincerely,    ROMELIA Aguayo CNP/rlm

## 2018-10-09 NOTE — PATIENT INSTRUCTIONS
Urology referral - call to set up an appointment with Dr. Perkins at the [facility].    Check blood sugar today.    LILIA MillardC

## 2018-10-09 NOTE — MR AVS SNAPSHOT
After Visit Summary   10/9/2018    Tri Mejia    MRN: [id number]           Patient Information     Date Of Birth          1970        Visit Information        Provider Department      10/9/2018 9:40 AM Suad Dang APRN CNP [facility]        Today's Diagnoses     Dysuria    -  1    Need for prophylactic vaccination and inoculation against influenza        Need for prophylactic vaccination with tetanus-diphtheria (Td)        Screening for malignant neoplasm of cervix        Screening for diabetes mellitus        Urinary frequency          Care Instructions    Urology referral - call to set up an appointment with Dr. Perkins at the [facility].    Check blood sugar today.    Suad Dang, NP-C            Follow-ups after your visit        Additional Services     UROLOGY ADULT REFERRAL       Your provider has referred you to: G: [facility] [phone]   [email or web address]    Please be aware that coverage of these services is subject to the terms and limitations of your health insurance plan.  Call member services at your health plan with any benefit or coverage questions.      Please bring the following with you to your appointment:    (1) Any X-Rays, CTs or MRIs which have been performed.  Contact the facility where they were done to arrange for  prior to your scheduled appointment.    (2) List of current medications  (3) This referral request   (4) Any documents/labs given to you for this referral                  Who to contact     If you have questions or need follow up information about today's clinic visit or your schedule please contact [facility] directly at [phone].  Normal or non-critical lab and imaging results will be communicated to you by MyChart, letter or phone within 4 business days after the clinic has received the results. If  "you do not hear from us within 7 days, please contact the clinic through c-LEctat or phone. If you have a critical or abnormal lab result, we will notify you by phone as soon as possible.  Submit refill requests through mobilePeople or call your pharmacy and they will forward the refill request to us. Please allow 3 business days for your refill to be completed.          Additional Information About Your Visit        Care EveryWhere ID     This is your Care EveryWhere ID. This could be used by other organizations to access your [facility] medical records  [id number]        Your Vitals Were     Pulse Temperature Respirations Height Pulse Oximetry BMI (Body Mass Index)    52 98.7  F (37.1  C) (Temporal) 16 5' 1.73\" (1.568 m) 100% 26.33 kg/m2       Blood Pressure from Last 3 Encounters:   10/09/18 118/64   03/21/18 106/66   07/16/17 129/77    Weight from Last 3 Encounters:   10/09/18 142 lb 11.2 oz (64.7 kg)   03/21/18 145 lb 1.6 oz (65.8 kg)   07/16/17 145 lb (65.8 kg)              We Performed the Following     Basic metabolic panel     FLU VACCINE, SPLIT VIRUS, IM (QUADRIVALENT) [78894]- >3 YRS     HPV High Risk Types DNA Cervical     Pap imaged thin layer screen with HPV - recommended age 30 - 65 years (select HPV order below)     UA reflex to Microscopic and Culture     UROLOGY ADULT REFERRAL     Vaccine Administration, Initial [[id number]]     Wet prep        Primary Care Provider Office Phone # Fax #    Suad ROMELIA Saldana [facility] [phone] [address] [address] [place] [address]        Equal Access to Services     [place] campusOSMANY : Hadii robert Oshea, waaxda luqadaha, qaybta kaalmamaria dolores landers. So [facility] [phone].    ATENCIÓN: Si habla español, tiene a renteria disposición servicios gratuitos de asistencia lingüística. Llame al [phone].    We comply with applicable federal civil rights laws and [place] laws. We do not discriminate on the " basis of race, color, national origin, age, disability, sex, sexual orientation, or gender identity.            Thank you!     Thank you for choosing [facility]  for your care. Our goal is always to provide you with excellent care. Hearing back from our patients is one way we can continue to improve our services. Please take a few minutes to complete the written survey that you may receive in the mail after your visit with us. Thank you!             Your Updated Medication List - Protect others around you: Learn how to safely use, store and throw away your medicines at www.disposemymeds.org.          This list is accurate as of 10/9/18 10:13 AM.  Always use your most recent med list.                   Brand Name Dispense Instructions for use Diagnosis    albuterol 108 (90 Base) MCG/ACT inhaler    PROAIR HFA/PROVENTIL HFA/VENTOLIN HFA    1 Inhaler    Inhale 2 puffs into the lungs every 6 hours as needed for shortness of breath / dyspnea or wheezing    Intermittent asthma, uncomplicated       fluticasone 50 MCG/ACT spray    FLONASE    16 g    Spray 1-2 sprays into both nostrils daily    Dysfunction of Eustachian tube, bilateral       MULTI-VITAMIN DAILY PO      Take  by mouth.        PRILOSEC PO      Take by mouth daily        VITAMIN D3 PO      Take by mouth daily        ZYRTEC PO

## 2018-10-10 ASSESSMENT — ASTHMA QUESTIONNAIRES: ACT_TOTALSCORE: 21

## 2018-10-11 LAB
COPATH REPORT: NORMAL
PAP: NORMAL

## 2018-10-12 DIAGNOSIS — Z13.1 SCREENING FOR DIABETES MELLITUS: ICD-10-CM

## 2018-10-12 DIAGNOSIS — Z13.6 ENCOUNTER FOR SCREENING FOR CARDIOVASCULAR DISORDERS: ICD-10-CM

## 2018-10-12 LAB
ANION GAP SERPL CALCULATED.3IONS-SCNC: 7 MMOL/L (ref 3–14)
BUN SERPL-MCNC: 16 MG/DL (ref 7–30)
CALCIUM SERPL-MCNC: 9 MG/DL (ref 8.5–10.1)
CHLORIDE SERPL-SCNC: 107 MMOL/L (ref 94–109)
CHOLEST SERPL-MCNC: 223 MG/DL
CO2 SERPL-SCNC: 26 MMOL/L (ref 20–32)
CREAT SERPL-MCNC: 0.76 MG/DL (ref 0.52–1.04)
GFR SERPL CREATININE-BSD FRML MDRD: 82 ML/MIN/1.7M2
GLUCOSE SERPL-MCNC: 93 MG/DL (ref 70–99)
HDLC SERPL-MCNC: 51 MG/DL
LDLC SERPL CALC-MCNC: 155 MG/DL
NONHDLC SERPL-MCNC: 172 MG/DL
POTASSIUM SERPL-SCNC: 3.8 MMOL/L (ref 3.4–5.3)
SODIUM SERPL-SCNC: 140 MMOL/L (ref 133–144)
TRIGL SERPL-MCNC: 86 MG/DL

## 2018-10-12 PROCEDURE — 36415 COLL VENOUS BLD VENIPUNCTURE: CPT | Performed by: STUDENT IN AN ORGANIZED HEALTH CARE EDUCATION/TRAINING PROGRAM

## 2018-10-12 PROCEDURE — 80061 LIPID PANEL: CPT | Performed by: STUDENT IN AN ORGANIZED HEALTH CARE EDUCATION/TRAINING PROGRAM

## 2018-10-12 PROCEDURE — 80048 BASIC METABOLIC PNL TOTAL CA: CPT | Performed by: STUDENT IN AN ORGANIZED HEALTH CARE EDUCATION/TRAINING PROGRAM

## 2018-10-15 LAB
FINAL DIAGNOSIS: NORMAL
HPV HR 12 DNA CVX QL NAA+PROBE: NEGATIVE
HPV16 DNA SPEC QL NAA+PROBE: NEGATIVE
HPV18 DNA SPEC QL NAA+PROBE: NEGATIVE
SPECIMEN DESCRIPTION: NORMAL
SPECIMEN SOURCE CVX/VAG CYTO: NORMAL

## 2018-10-22 ENCOUNTER — TELEPHONE (OUTPATIENT)
Dept: FAMILY MEDICINE | Facility: OTHER | Age: 48
End: 2018-10-22

## 2018-10-22 NOTE — TELEPHONE ENCOUNTER
You placed a referral for patient to urology on 10/9/18.  Patient has not scheduled as of yet.      Please review and forward to team if follow up with the patient is needed.     Thank you!  Meagan/Clinic Referrals Dyad II

## 2018-12-22 ENCOUNTER — OFFICE VISIT (OUTPATIENT)
Dept: URGENT CARE | Facility: RETAIL CLINIC | Age: 48
End: 2018-12-22
Payer: COMMERCIAL

## 2018-12-22 VITALS
SYSTOLIC BLOOD PRESSURE: 124 MMHG | HEART RATE: 65 BPM | OXYGEN SATURATION: 100 % | DIASTOLIC BLOOD PRESSURE: 81 MMHG | TEMPERATURE: 98.1 F

## 2018-12-22 DIAGNOSIS — J45.21 MILD INTERMITTENT ASTHMA WITH ACUTE EXACERBATION: Primary | ICD-10-CM

## 2018-12-22 PROCEDURE — 99213 OFFICE O/P EST LOW 20 MIN: CPT | Performed by: PHYSICIAN ASSISTANT

## 2018-12-22 RX ORDER — PREDNISONE 20 MG/1
40 TABLET ORAL DAILY
Qty: 10 TABLET | Refills: 0 | Status: SHIPPED | OUTPATIENT
Start: 2018-12-22 | End: 2018-12-27

## 2018-12-22 NOTE — PATIENT INSTRUCTIONS
Prednisone 40mg daily for 5 days.  Albuterol every 4 hours as needed.  Rest! Your body needs more rest to heal.  Drink plenty of fluids (warm fluids like tea or soup are soothing and reduce cough)  Sit in the bathroom with a hot shower running and breathe in the steam.  Honey may soothe your sore throat and help manage your cough- may take straight or in warm water with lemon juice.  Avoid smoke (cigarettes, bonfires, fireplace, wood burning stoves).  Take Tylenol or an NSAID such as ibuprofen or naproxen as needed for pain.  Delsym (dextromethorphan polistirex) is an over the counter cough medication that lasts 12 hours.   Mucinex or Robitussin (guiafenesin) thin mucus and may help it to loosen more quickly  Good handwashing is the best way to prevent spread of germs  Present to emergency room if you develop trouble breathing, swallowing or cough-up blood.  Follow up with your primary care provider if symptoms worsen or fail to improve as expected.

## 2018-12-22 NOTE — PROGRESS NOTES
Chief Complaint   Patient presents with     Throat Problem     drainage into chest; no cough; had a cold a month ago     Breathing Problem     no cough; 3 days of left side flank pain, pain in heart area when breathing; labored breathing     SUBJECTIVE:  Tri Mejia is a 48 year old female who presents to the clinic today with a chief complaint of post nasal drip, cough and shortness of breath for 3 days. She has a history of asthma and acid reflux. She has had a cold for about 1 month but symptoms of chest tightness/labored breathing started 3 days ago.  Her cough is described as occasional but feeling tight in her chest.  The patient's symptoms are moderate and worsening.  Associated symptoms include some left lower lobe chest tightness.  The patient's symptoms are exacerbated by no particular triggers.  Patient has been using nothing to improve symptoms.  Predisposing factors include: HX of asthma.    Past Medical History:   Diagnosis Date     Allergic rhinitis, cause unspecified     Seasonal     Depressive disorder, not elsewhere classified      PVC (premature ventricular contraction)      RECTAL & ANAL HEMORRHAGE 5/10/2007     Unspecified asthma(493.90)      Current Outpatient Medications   Medication Sig Dispense Refill     albuterol (PROAIR HFA/PROVENTIL HFA/VENTOLIN HFA) 108 (90 BASE) MCG/ACT Inhaler Inhale 2 puffs into the lungs every 6 hours as needed for shortness of breath / dyspnea or wheezing 1 Inhaler 11     Cetirizine HCl (ZYRTEC PO)        Cholecalciferol (VITAMIN D3 PO) Take by mouth daily       Multiple Vitamin (MULTI-VITAMIN DAILY PO) Take  by mouth.       Omeprazole (PRILOSEC PO) Take by mouth daily       predniSONE (DELTASONE) 20 MG tablet Take 40 mg by mouth daily for 5 days. 10 tablet 0     Social History     Tobacco Use     Smoking status: Former Smoker     Last attempt to quit: 2005     Years since quittin.6     Smokeless tobacco: Never Used   Substance Use Topics      Alcohol use: Yes     Comment:  a couple of beers weeklly     Allergies   Allergen Reactions     Sulfa Drugs Hives     bactrim     REVIEW OF SYSTEMS  General: NEGATIVE for fever, chills.  ENT: POSITIVE for some nasal congestion, post nasal drip. NEGATIVE for ear pain, sore throat.  Resp: POSITIVE for asthma and SOB. NEGATIVE for sputum and hemoptysis.    OBJECTIVE:  /81 (BP Location: Left arm)   Pulse 65   Temp 98.1  F (36.7  C) (Oral)   SpO2 100%   GENERAL APPEARANCE: healthy, alert and in no distress  HEENT: PERRL, conjunctiva clear. Bilateral ear canals and TM's normal. Nose without erythematous or edematous turbinates. Posterior pharynx nonerythematous and without tonsillar hypertrophy or exudate.  NECK: supple, nontender, no lymphadenopathy  RESP: lungs clear to auscultation - no rales, rhonchi or wheezes. Breathing is comfortable, not labored and without use of accessory muscles.  CV: regular rates and rhythm, normal S1 S2, no murmur noted    ASSESSMENT:    ICD-10-CM    1. Mild intermittent asthma with acute exacerbation J45.21 predniSONE (DELTASONE) 20 MG tablet     PLAN:   Patient Instructions   Prednisone 40mg daily for 5 days.  Albuterol every 4 hours as needed.  Rest! Your body needs more rest to heal.  Drink plenty of fluids (warm fluids like tea or soup are soothing and reduce cough)  Sit in the bathroom with a hot shower running and breathe in the steam.  Honey may soothe your sore throat and help manage your cough- may take straight or in warm water with lemon juice.  Avoid smoke (cigarettes, bonfires, fireplace, wood burning stoves).  Take Tylenol or an NSAID such as ibuprofen or naproxen as needed for pain.  Delsym (dextromethorphan polistirex) is an over the counter cough medication that lasts 12 hours.   Mucinex or Robitussin (guiafenesin) thin mucus and may help it to loosen more quickly  Good handwashing is the best way to prevent spread of germs  Present to emergency room if you develop  trouble breathing, swallowing or cough-up blood.  Follow up with your primary care provider if symptoms worsen or fail to improve as expected.    Follow up with primary care provider with any problems, questions or concerns or if symptoms worsen or fail to improve. Patient agreed to plan and verbalized understanding.    Nazanin Yeboah PA-C  [facility] [place]

## 2018-12-24 ENCOUNTER — NURSE TRIAGE (OUTPATIENT)
Dept: NURSING | Facility: CLINIC | Age: 48
End: 2018-12-24

## 2018-12-24 NOTE — TELEPHONE ENCOUNTER
Tri was prescribed Prednisone from [facility] clinic and has questions on Prednisone.  Breathing is currently fine.

## 2019-03-08 ENCOUNTER — TELEPHONE (OUTPATIENT)
Dept: OTHER | Facility: CLINIC | Age: 49
End: 2019-03-08

## 2019-04-01 ENCOUNTER — HOSPITAL ENCOUNTER (OUTPATIENT)
Dept: MAMMOGRAPHY | Facility: CLINIC | Age: 49
Discharge: HOME | End: 2019-04-01
Attending: STUDENT IN AN ORGANIZED HEALTH CARE EDUCATION/TRAINING PROGRAM | Admitting: STUDENT IN AN ORGANIZED HEALTH CARE EDUCATION/TRAINING PROGRAM
Payer: COMMERCIAL

## 2019-04-01 DIAGNOSIS — Z12.31 VISIT FOR SCREENING MAMMOGRAM: ICD-10-CM

## 2019-04-01 DIAGNOSIS — J45.20 INTERMITTENT ASTHMA, UNCOMPLICATED: ICD-10-CM

## 2019-04-01 PROCEDURE — 77063 BREAST TOMOSYNTHESIS BI: CPT

## 2019-04-02 RX ORDER — ALBUTEROL SULFATE 90 UG/1
AEROSOL, METERED RESPIRATORY (INHALATION)
Qty: 18 G | Refills: 0 | Status: SHIPPED | OUTPATIENT
Start: 2019-04-02 | End: 2019-05-21

## 2019-05-20 NOTE — PROGRESS NOTES
Subjective     Tri Mejia is a 49 year old female who presents to clinic today for the following health issues:    History of Present Illness     Asthma:  She presents for follow up of asthma.  She has no cough, no wheezing, and no shortness of breath. She is using a relief medication a few times a week. She does not have a controller medication. Patient is aware of the following triggers: exercise or sports, gastric reflux, pollens and strong odors and fumes. The patient has not had a visit to the Emergency Room, Urgent Care or Hospital due to asthma since the last clinic visit.     She eats 2-3 servings of fruits and vegetables daily.She consumes 0 sweetened beverage(s) daily.  She is taking medications regularly.     Patient Active Problem List   Diagnosis     Allergic rhinitis     Palpitations     Excessive or frequent menstruation     Hemorrhage of rectum and anus     CARDIOVASCULAR SCREENING; LDL GOAL LESS THAN 160     GERD (gastroesophageal reflux disease)     Intermittent asthma     Pelvic pain in female     Past Surgical History:   Procedure Laterality Date     COLONOSCOPY  05/21/2007    Sm internal hemorrhoids, otherwise normal. Repeat age 50.     ESOPHAGOSCOPY, GASTROSCOPY, DUODENOSCOPY (EGD), COMBINED  10/3/2011    Procedure:COMBINED ESOPHAGOSCOPY, GASTROSCOPY, DUODENOSCOPY (EGD), BIOPSY SINGLE OR MULTIPLE; esophagogastroduodenoscopy with biopsy and dilatation, MULTIPLE BX; Surgeon:CHARY MORAN; Location:[facility]     ESOPHAGOSCOPY, GASTROSCOPY, DUODENOSCOPY (EGD), DILATATION, COMBINED  10/3/2011    Procedure:COMBINED ESOPHAGOSCOPY, GASTROSCOPY, DUODENOSCOPY (EGD), DILATATION; Surgeon:CHARY MORAN; Location:[facility]     HC HYSTEROSCOPY W ENDOMETRIAL BX/POLYPECTOMY W/WO D&C  1/19/2007    Hysteroscopy, D&C     HC REMOVAL OF TONSILS,<13 Y/O  1976    Tonsils <12y.o.       Social History     Tobacco Use     Smoking status: Former Smoker     Last attempt to quit: 4/26/2005     Years since  "quittin.0     Smokeless tobacco: Never Used   Substance Use Topics     Alcohol use: Yes     Comment:  a couple of beers weeklly     Family History   Problem Relation Age of Onset     Lipids Mother      Connective Tissue Disorder Mother         osteopenia     Hypertension Father      Arthritis Father      Lipids Father      Cerebrovascular Disease Paternal Grandmother      Cerebrovascular Disease Paternal Grandfather      Depression Sister      Depression Brother      Lipids Sister      C.A.D. Sister          of MI at 42. obese and developmental delay         Current Outpatient Medications   Medication Sig Dispense Refill     albuterol (VENTOLIN HFA) 108 (90 Base) MCG/ACT inhaler INHALE 2 PUFFS INTO THE LUNGS EVERY 6 HOURS AS NEEDED FOR SHORTNESS OF BREATH OR WHEEZING 18 g 5     Cetirizine HCl (ZYRTEC PO)        Cholecalciferol (VITAMIN D3 PO) Take by mouth daily       Multiple Vitamin (MULTI-VITAMIN DAILY PO) Take  by mouth.       Omeprazole (PRILOSEC PO) Take by mouth daily       Allergies   Allergen Reactions     Sulfa Drugs Hives     bactrim     Recent Labs   Lab Test 10/12/18  0923 17  0807 04/29/15  0857  08/15/11  1350   A1C  --  5.4  --   --   --    * 129* 142*   < >  --    HDL 51 55 60   < >  --    TRIG 86 106 71   < >  --    CR 0.76  --   --   --   --    GFRESTIMATED 82  --   --   --   --    GFRESTBLACK >90  --   --   --   --    POTASSIUM 3.8  --   --   --   --    TSH  --   --   --   --  1.61    < > = values in this interval not displayed.        Reviewed and updated as needed this visit by Provider         Review of Systems   ROS COMP: Constitutional, HEENT, cardiovascular, pulmonary, gi and gu systems are negative, except as otherwise noted.      Objective    /74   Pulse 52   Temp 97.7  F (36.5  C) (Temporal)   Resp 18   Ht 1.568 m (5' 1.73\")   Wt 64.7 kg (142 lb 11.2 oz)   SpO2 100%   BMI 26.33 kg/m    Body mass index is 26.33 kg/m .  Physical Exam   GENERAL: healthy, " alert and no distress  RESP: lungs clear to auscultation - no rales, rhonchi or wheezes  CV: regular rate and rhythm, normal S1 S2, no S3 or S4, no murmur, click or rub, no peripheral edema and peripheral pulses strong  MS: no gross musculoskeletal defects noted, no edema  SKIN: no suspicious lesions or rashes  NEURO: Normal strength and tone, mentation intact and speech normal  PSYCH: mentation appears normal, affect normal/bright    Diagnostic Test Results:  Labs reviewed in Epic        Assessment & Plan     1. Intermittent asthma, uncomplicated  Stable. Continue current medication(s) and dose(s).   - albuterol (VENTOLIN HFA) 108 (90 Base) MCG/ACT inhaler; INHALE 2 PUFFS INTO THE LUNGS EVERY 6 HOURS AS NEEDED FOR SHORTNESS OF BREATH OR WHEEZING  Dispense: 18 g; Refill: 5    2. Need for vaccination  - TD PRSERV FREE >=7 YRS ADS IM [[id number]]  - 1st  Administration  [[id number]]    No follow-ups on file.    ROMELIA Aguayo [facility]

## 2019-05-21 ENCOUNTER — OFFICE VISIT (OUTPATIENT)
Dept: FAMILY MEDICINE | Facility: OTHER | Age: 49
End: 2019-05-21
Payer: COMMERCIAL

## 2019-05-21 VITALS
HEART RATE: 52 BPM | SYSTOLIC BLOOD PRESSURE: 114 MMHG | RESPIRATION RATE: 18 BRPM | TEMPERATURE: 97.7 F | OXYGEN SATURATION: 100 % | DIASTOLIC BLOOD PRESSURE: 74 MMHG | HEIGHT: 62 IN | WEIGHT: 142.7 LBS | BODY MASS INDEX: 26.26 KG/M2

## 2019-05-21 DIAGNOSIS — J45.20 INTERMITTENT ASTHMA, UNCOMPLICATED: Primary | ICD-10-CM

## 2019-05-21 DIAGNOSIS — Z23 NEED FOR VACCINATION: ICD-10-CM

## 2019-05-21 PROCEDURE — 99213 OFFICE O/P EST LOW 20 MIN: CPT | Mod: 25 | Performed by: STUDENT IN AN ORGANIZED HEALTH CARE EDUCATION/TRAINING PROGRAM

## 2019-05-21 PROCEDURE — 90471 IMMUNIZATION ADMIN: CPT | Performed by: STUDENT IN AN ORGANIZED HEALTH CARE EDUCATION/TRAINING PROGRAM

## 2019-05-21 PROCEDURE — 90714 TD VACC NO PRESV 7 YRS+ IM: CPT | Performed by: STUDENT IN AN ORGANIZED HEALTH CARE EDUCATION/TRAINING PROGRAM

## 2019-05-21 RX ORDER — ALBUTEROL SULFATE 90 UG/1
AEROSOL, METERED RESPIRATORY (INHALATION)
Qty: 18 G | Refills: 5 | Status: SHIPPED | OUTPATIENT
Start: 2019-05-21 | End: 2020-01-13

## 2019-05-21 ASSESSMENT — MIFFLIN-ST. JEOR: SCORE: 1221.28

## 2019-05-21 NOTE — LETTER
My Asthma Action Plan  Name: Tri Mejia   YOB: 1970  Date: 5/21/2019   My doctor: ROMELIA Aguayo CNP   My clinic: [facility]        My Control Medicine: None  My Rescue Medicine: Albuterol (Proair/Ventolin/Proventil) inhaler PRN   My Asthma Severity: intermittent  Avoid your asthma triggers: pollens, strong odors and fumes, exercise or sports and Gastric Reflux               GREEN ZONE   Good Control    I feel good    No cough or wheeze    Can work, sleep and play without asthma symptoms       Take your asthma control medicine every day.     1. If exercise triggers your asthma, take your rescue medication    15 minutes before exercise or sports, and    During exercise if you have asthma symptoms  2. Spacer to use with inhaler: If you have a spacer, make sure to use it with your inhaler             YELLOW ZONE Getting Worse  I have ANY of these:    I do not feel good    Cough or wheeze    Chest feels tight    Wake up at night   1. Keep taking your Green Zone medications  2. Start taking your rescue medicine:    every 20 minutes for up to 1 hour. Then every 4 hours for 24-48 hours.  3. If you stay in the Yellow Zone for more than 12-24 hours, contact your doctor.  4. If you do not return to the Green Zone in 12-24 hours or you get worse, start taking your oral steroid medicine if prescribed by your provider.           RED ZONE Medical Alert - Get Help  I have ANY of these:    I feel awful    Medicine is not helping    Breathing getting harder    Trouble walking or talking    Nose opens wide to breathe       1. Take your rescue medicine NOW  2. If your provider has prescribed an oral steroid medicine, start taking it NOW  3. Call your doctor NOW  4. If you are still in the Red Zone after 20 minutes and you have not reached your doctor:    Take your rescue medicine again and    Call 911 or go to the emergency room right away    See your regular doctor within 2 weeks of  an Emergency Room or Urgent Care visit for follow-up treatment.          Annual Reminders:  Meet with Asthma Educator,  Flu Shot in the Fall, consider Pneumonia Vaccination for patients with asthma (aged 19 and older).    Pharmacy:    [id number] - [place], [place] - [address] [facility]  [place] - [address] - [place], [place] - [address]                      Asthma Triggers  How To Control Things That Make Your Asthma Worse    Triggers are things that make your asthma worse.  Look at the list below to help you find your triggers and what you can do about them.  You can help prevent asthma flare-ups by staying away from your triggers.      Trigger                                                          What you can do   Cigarette Smoke  Tobacco smoke can make asthma worse. Do not allow smoking in your home, car or around you.  Be sure no one smokes at a child s day care or school.  If you smoke, ask your health care provider for ways to help you quit.  Ask family members to quit too.  Ask your health care provider for a referral to Quit Plan to help you quit smoking, or call 1-308-521-PLAN.     Colds, Flu, Bronchitis  These are common triggers of asthma. Wash your hands often.  Don t touch your eyes, nose or mouth.  Get a flu shot every year.     Dust Mites  These are tiny bugs that live in cloth or carpet. They are too small to see. Wash sheets and blankets in hot water every week.   Encase pillows and mattress in dust mite proof covers.  Avoid having carpet if you can. If you have carpet, vacuum weekly.   Use a dust mask and HEPA vacuum.   Pollen and Outdoor Mold  Some people are allergic to trees, grass, or weed pollen, or molds. Try to keep your windows closed.  Limit time out doors when pollen count is high.   Ask you health care provider about taking medicine during allergy season.     Animal Dander  Some people are allergic to skin flakes, urine or  saliva from pets with fur or feathers. Keep pets with fur or feathers out of your home.    If you can t keep the pet outdoors, then keep the pet out of your bedroom.  Keep the bedroom door closed.  Keep pets off cloth furniture and away from stuffed toys.     Mice, Rats, and Cockroaches  Some people are allergic to the waste from these pests.   Cover food and garbage.  Clean up spills and food crumbs.  Store grease in the refrigerator.   Keep food out of the bedroom.   Indoor Mold  This can be a trigger if your home has high moisture. Fix leaking faucets, pipes, or other sources of water.   Clean moldy surfaces.  Dehumidify basement if it is damp and smelly.   Smoke, Strong Odors, and Sprays  These can reduce air quality. Stay away from strong odors and sprays, such as perfume, powder, hair spray, paints, smoke incense, paint, cleaning products, candles and new carpet.   Exercise or Sports  Some people with asthma have this trigger. Be active!  Ask your doctor about taking medicine before sports or exercise to prevent symptoms.    Warm up for 5-10 minutes before and after sports or exercise.     Other Triggers of Asthma  Cold air:  Cover your nose and mouth with a scarf.  Sometimes laughing or crying can be a trigger.  Some medicines and food can trigger asthma.

## 2019-05-21 NOTE — NURSING NOTE
Screening Questionnaire for Adult Immunization    Are you sick today?   No   Do you have allergies to medications, food, a vaccine component or latex?   Yes   Have you ever had a serious reaction after receiving a vaccination?   No   Do you have a long-term health problem with heart disease, lung disease, asthma, kidney disease, metabolic disease (e.g. diabetes), anemia, or other blood disorder?   Yes   Do you have cancer, leukemia, HIV/AIDS, or any other immune system problem?   No   In the past 3 months, have you taken medications that affect  your immune system, such as prednisone, other steroids, or anticancer drugs; drugs for the treatment of rheumatoid arthritis, Crohn s disease, or psoriasis; or have you had radiation treatments?   No   Have you had a seizure, or a brain or other nervous system problem?   No   During the past year, have you received a transfusion of blood or blood     products, or been given immune (gamma) globulin or antiviral drug?   No   For women: Are you pregnant or is there a chance you could become        pregnant during the next month?   No   Have you received any vaccinations in the past 4 weeks?   No     Immunization questionnaire was positive for at least one answer.  Notified Suad Dang.        Per orders of Suad Dang, injection of Td given by Liliana Walsh. Patient instructed to remain in clinic for 15 minutes afterwards, and to report any adverse reaction to me immediately.       Screening performed by Liliana Walsh on 5/21/2019 at 11:05 AM.

## 2019-05-22 ASSESSMENT — ASTHMA QUESTIONNAIRES: ACT_TOTALSCORE: 22

## 2019-05-23 ENCOUNTER — NURSE TRIAGE (OUTPATIENT)
Dept: NURSING | Facility: CLINIC | Age: 49
End: 2019-05-23

## 2019-05-24 ENCOUNTER — OFFICE VISIT (OUTPATIENT)
Dept: FAMILY MEDICINE | Facility: OTHER | Age: 49
End: 2019-05-24
Payer: COMMERCIAL

## 2019-05-24 VITALS
HEIGHT: 62 IN | SYSTOLIC BLOOD PRESSURE: 104 MMHG | TEMPERATURE: 98.5 F | RESPIRATION RATE: 16 BRPM | BODY MASS INDEX: 26.15 KG/M2 | HEART RATE: 64 BPM | DIASTOLIC BLOOD PRESSURE: 64 MMHG | WEIGHT: 142.1 LBS

## 2019-05-24 DIAGNOSIS — J30.9 CHRONIC ALLERGIC RHINITIS: ICD-10-CM

## 2019-05-24 DIAGNOSIS — T50.Z95A ADVERSE EFFECT OF VACCINE, INITIAL ENCOUNTER: Primary | ICD-10-CM

## 2019-05-24 DIAGNOSIS — Z91.018 FOOD ALLERGY: ICD-10-CM

## 2019-05-24 PROCEDURE — 99213 OFFICE O/P EST LOW 20 MIN: CPT | Performed by: STUDENT IN AN ORGANIZED HEALTH CARE EDUCATION/TRAINING PROGRAM

## 2019-05-24 RX ORDER — CEPHALEXIN 500 MG/1
500 CAPSULE ORAL 3 TIMES DAILY
Qty: 30 CAPSULE | Refills: 0 | Status: SHIPPED | OUTPATIENT
Start: 2019-05-24 | End: 2019-09-08

## 2019-05-24 ASSESSMENT — MIFFLIN-ST. JEOR: SCORE: 1218.56

## 2019-05-24 NOTE — TELEPHONE ENCOUNTER
Received tetanus shot in R arm during visit to clinic 2 days ago (5/21) Tonight has swollen, tender gland in L axilla. Injection site itself less painful than yesterday and no redness or swelling at site. No fever. Not feeling ill at all. Found the swollen gland while working out. No other sx of infection. Advised home care but advised call provider tomorrow when clinic is open and ask if she thinks this needs evaluation in clinic. Call back if other s/s of infection (fever, redness at injection site, etc. ) Brisa Moya RN/FNA      Additional Information    Negative: [1] Difficulty with breathing or swallowing AND [2] starts within 2 hours after injection    Negative: Difficult to awaken or acting confused (e.g., disoriented, slurred speech)    Negative: Unresponsive, passed out, or very weak    Negative: Sounds like a life-threatening emergency to the triager    Negative: Fever > 104 F (40 C)    Negative: [1] Fever > 101 F (38.3 C) AND [2] age > 60    Negative: [1] Fever > 100.0 F (37.8 C) AND [2] bedridden (e.g., nursing home patient, CVA, chronic illness, recovering from surgery)    Negative: [1] Fever > 100.0 F (37.8 C) AND [2] diabetes mellitus or weak immune system (e.g., HIV positive, cancer chemo, splenectomy, chronic steroids)    Negative: [1] Measles vaccine rash (onset day 6-12) AND [2] purple or blood-colored    Negative: Sounds like a severe, unusual reaction to the triager    Negative: [1] Redness or red streak around the injection site AND [2] begins > 48 hours after shot AND [3] fever    Negative: [1] Redness or red streak around the injection site AND [2] begins > 48 hours after shot AND [3] no fever  (Exception: red area < 1 inch or 2.5 cm wide)    Negative: Fever present > 3 days (72 hours)    Negative: [1] Over 3 days (72 hours) since shot AND [2] redness, swelling or pain getting worse    Negative: [1] Smallpox vaccine and [2] eye pain, eye redness, or rash on eyelids    Negative: [1] Pain,  tenderness, or swelling at the injection site AND [2] persists > 3 days    Negative: [1] Measles vaccine rash (onset day 6-12) AND [2] persists > 3 days    Negative: [1] Deep lump follows (in 2 to 8 weeks) Td or TDaP  shot AND [2] becomes tender to the touch    Negative: Immunization needed, questions about    Injection site reaction to any vaccine    Protocols used: IMMUNIZATION [id number]

## 2019-05-24 NOTE — PROGRESS NOTES
Subjective     Tri Mejia is a 49 year old female who presents to clinic today for the following health issues:    HPI     Patient presents to clinic with concerns of possible reaction to tetanus vaccine.  She had the vaccine on May 21 in the right deltoid.  She was doing jumping jacks the other day at the gym and felt pain into her right armpit.  She noticed some swelling on the front side of her right arm and shoulder.  She has a history of allergies to a variety of things and states she is not surprised if she would have had a reaction to the vaccine.  Denies fever or chills.  She has sensitivities to onions and garlic which cause her extreme intestinal upset if she eats them.  She also has chronic year-long allergies and takes a daily allergy medication.  She has allergy to sulfa drugs.  She has not been seen by an allergist but would like a referral to see someone to find out exactly what she is allergic to so she can know what to avoid.  She would like a referral today if possible.    Nurse triage notes:  Received tetanus shot in R arm during visit to clinic 2 days ago (5/21) Tonight has swollen, tender gland in L axilla. Injection site itself less painful than yesterday and no redness or swelling at site. No fever. Not feeling ill at all. Found the swollen gland while working out. No other sx of infection. Advised home care but advised call provider tomorrow when clinic is open and ask if she thinks this needs evaluation in clinic. Call back if other s/s of infection (fever, redness at injection site, etc. ) Brisa Moya RN/FNA      Patient Active Problem List   Diagnosis     Allergic rhinitis     Palpitations     Excessive or frequent menstruation     Hemorrhage of rectum and anus     CARDIOVASCULAR SCREENING; LDL GOAL LESS THAN 160     GERD (gastroesophageal reflux disease)     Intermittent asthma     Pelvic pain in female     Past Surgical History:   Procedure Laterality Date     COLONOSCOPY   2007    Sm internal hemorrhoids, otherwise normal. Repeat age 50.     ESOPHAGOSCOPY, GASTROSCOPY, DUODENOSCOPY (EGD), COMBINED  10/3/2011    Procedure:COMBINED ESOPHAGOSCOPY, GASTROSCOPY, DUODENOSCOPY (EGD), BIOPSY SINGLE OR MULTIPLE; esophagogastroduodenoscopy with biopsy and dilatation, MULTIPLE BX; Surgeon:CHARY MORAN; Location:[facility]     ESOPHAGOSCOPY, GASTROSCOPY, DUODENOSCOPY (EGD), DILATATION, COMBINED  10/3/2011    Procedure:COMBINED ESOPHAGOSCOPY, GASTROSCOPY, DUODENOSCOPY (EGD), DILATATION; Surgeon:CHARY MORAN; Location:[facility]     HC HYSTEROSCOPY W ENDOMETRIAL BX/POLYPECTOMY W/WO D&C  2007    Hysteroscopy, D&C     HC REMOVAL OF TONSILS,<11 Y/O  1976    Tonsils <12y.o.       Social History     Tobacco Use     Smoking status: Former Smoker     Last attempt to quit: 2005     Years since quittin.0     Smokeless tobacco: Never Used   Substance Use Topics     Alcohol use: Yes     Comment:  a couple of beers weeklly     Family History   Problem Relation Age of Onset     Lipids Mother      Connective Tissue Disorder Mother         osteopenia     Hypertension Father      Arthritis Father      Lipids Father      Cerebrovascular Disease Paternal Grandmother      Cerebrovascular Disease Paternal Grandfather      Depression Sister      Depression Brother      Lipids Sister      C.A.D. Sister          of MI at 42. obese and developmental delay         Current Outpatient Medications   Medication Sig Dispense Refill     albuterol (VENTOLIN HFA) 108 (90 Base) MCG/ACT inhaler INHALE 2 PUFFS INTO THE LUNGS EVERY 6 HOURS AS NEEDED FOR SHORTNESS OF BREATH OR WHEEZING 18 g 5     cephALEXin (KEFLEX) 500 MG capsule Take 1 capsule (500 mg) by mouth 3 times daily for 10 days 30 capsule 0     Cetirizine HCl (ZYRTEC PO)        Cholecalciferol (VITAMIN D3 PO) Take by mouth daily       Multiple Vitamin (MULTI-VITAMIN DAILY PO) Take  by mouth.       Omeprazole (PRILOSEC PO) Take by mouth daily    "    Allergies   Allergen Reactions     Sulfa Drugs Hives     bactrim     Recent Labs   Lab Test 10/12/18  0923 02/21/17  0807 04/29/15  0857  08/15/11  1350   A1C  --  5.4  --   --   --    * 129* 142*   < >  --    HDL 51 55 60   < >  --    TRIG 86 106 71   < >  --    CR 0.76  --   --   --   --    GFRESTIMATED 82  --   --   --   --    GFRESTBLACK >90  --   --   --   --    POTASSIUM 3.8  --   --   --   --    TSH  --   --   --   --  1.61    < > = values in this interval not displayed.      BP Readings from Last 3 Encounters:   05/24/19 104/64   05/21/19 114/74   12/22/18 124/81    Wt Readings from Last 3 Encounters:   05/24/19 64.5 kg (142 lb 1.6 oz)   05/21/19 64.7 kg (142 lb 11.2 oz)   10/09/18 64.7 kg (142 lb 11.2 oz)                    Reviewed and updated as needed this visit by Provider         Review of Systems   ROS COMP: Constitutional, HEENT, cardiovascular, pulmonary, gi and gu systems are negative, except as otherwise noted.      Objective    /64   Pulse 64   Temp 98.5  F (36.9  C) (Temporal)   Resp 16   Ht 1.568 m (5' 1.73\")   Wt 64.5 kg (142 lb 1.6 oz)   BMI 26.22 kg/m    Body mass index is 26.22 kg/m .  Physical Exam   MS: Induration on right deltoid at site of injection, no erythema, mild surrounding edema that extends into the anterior proximal right arm.  Range of motion intact.  NEURO: Normal strength and tone, mentation intact and speech normal    Diagnostic Test Results:  Labs reviewed in Epic        Assessment & Plan     1. Adverse effect of vaccine, initial encounter  As of now it looks like she has a localized reaction causing swelling.  There is a little warmth at the site of injection but no erythema so at this point I do not suspect infection.  I recommended that she monitor her symptoms over the weekend and if she develops redness increasing warmth or swelling to start the cephalexin 3 times daily for 10 days.  I discussed with her also if it should become red warm and " swollen and she develops fever chills that she should be seen in urgent care or ER.  Otherwise follow-up as needed.  - cephALEXin (KEFLEX) 500 MG capsule; Take 1 capsule (500 mg) by mouth 3 times daily for 10 days  Dispense: 30 capsule; Refill: 0    2. Food allergy  Patient requested referral to allergy and asthma to evaluate what she is specifically allergic to.  - ALLERGY/ASTHMA ADULT REFERRAL    3. Chronic allergic rhinitis  - ALLERGY/ASTHMA ADULT REFERRAL     No follow-ups on file.    ROMELIA Aguayo [facility]

## 2019-06-06 ENCOUNTER — TELEPHONE (OUTPATIENT)
Dept: FAMILY MEDICINE | Facility: OTHER | Age: 49
End: 2019-06-06

## 2019-06-06 NOTE — LETTER
[facility]  [address] [place] [address]  Phone: [phone]        June 27, 2019      Tri Oscar Mejia                                                                                                                                [id number] [facility] [address]            Dear Ms. Mejia,    We received a notice that you are to be scheduled with a specialty clinic. The referral has been placed by your provider and you can call to schedule an appointment directly.     Enclosed, you will find the referral with the phone number to call to schedule an appointment.  If you have already scheduled this, you may disregard this letter.    Please call us if you have any questions or concerns.        Thank you,      Your [facility]

## 2019-06-06 NOTE — TELEPHONE ENCOUNTER
You placed a referral for patient to Allergy & Asthma on 5/24/19.  Patient has not scheduled as of yet.      Please review and forward to team if follow up with the patient is needed.     Thank you!  Meagan/Clinic Referrals Dyad II

## 2019-07-01 ENCOUNTER — HOSPITAL ENCOUNTER (EMERGENCY)
Facility: CLINIC | Age: 49
Discharge: HOME | End: 2019-07-01
Attending: NURSE PRACTITIONER | Admitting: NURSE PRACTITIONER
Payer: COMMERCIAL

## 2019-07-01 ENCOUNTER — TELEPHONE (OUTPATIENT)
Dept: FAMILY MEDICINE | Facility: OTHER | Age: 49
End: 2019-07-01

## 2019-07-01 VITALS
WEIGHT: 143 LBS | SYSTOLIC BLOOD PRESSURE: 119 MMHG | OXYGEN SATURATION: 95 % | TEMPERATURE: 96.3 F | DIASTOLIC BLOOD PRESSURE: 85 MMHG | HEART RATE: 62 BPM | BODY MASS INDEX: 26.38 KG/M2 | RESPIRATION RATE: 16 BRPM

## 2019-07-01 DIAGNOSIS — R09.A2 SENSATION OF FOREIGN BODY IN LARYNX: ICD-10-CM

## 2019-07-01 PROCEDURE — 99283 EMERGENCY DEPT VISIT LOW MDM: CPT | Mod: Z6 | Performed by: NURSE PRACTITIONER

## 2019-07-01 PROCEDURE — 99282 EMERGENCY DEPT VISIT SF MDM: CPT | Performed by: NURSE PRACTITIONER

## 2019-07-01 ASSESSMENT — ENCOUNTER SYMPTOMS
VOMITING: 0
NAUSEA: 0
CHEST TIGHTNESS: 0
FEVER: 0
TROUBLE SWALLOWING: 0
SORE THROAT: 0
STRIDOR: 0

## 2019-07-01 NOTE — TELEPHONE ENCOUNTER
Tri Mejia is a 49 year old female who calls with cucumber seed.    NURSING ASSESSMENT:  Description:  I spoke with the pt who states she swallowed a seed funny. Can feel it and states it really hurts. She said she has some wheezing unable to hear it on the phone.  Onset/duration:  5-10 minutes ago.    Allergies:   Allergies   Allergen Reactions     Sulfa Drugs Hives     bactrim     RECOMMENDED DISPOSITION:  To ED/UC for evaluation, another person to drive-pt is concerned  Will comply with recommendation: Yes  If further questions/concerns or if symptoms do not improve, worsen or new symptoms develop, call your PCP or [facility] as soon as possible.      Guideline used:  Telephone Triage Protocols for Nurses, Fifth Edition, Khadra Kennedy RN

## 2019-07-01 NOTE — ED AVS SNAPSHOT
[facility]  [address] [place] [address]  Phone:  [phone]  Fax:  [phone]                                    Tri Mejia   MRN: [id number]    Department:  [facility]   Date of Visit:  7/1/2019           After Visit Summary Signature Page    I have received my discharge instructions, and my questions have been answered. I have discussed any challenges I see with this plan with the nurse or doctor.    ..........................................................................................................................................  Patient/Patient Representative Signature      ..........................................................................................................................................  Patient Representative Print Name and Relationship to Patient    ..................................................               ................................................  Date                                   Time    ..........................................................................................................................................  Reviewed by Signature/Title    ...................................................              ..............................................  Date                                               Time          22EPIC Rev 08/18

## 2019-07-01 NOTE — ED TRIAGE NOTES
Eating dinner and felt a cucumber seed get caught in throat.  Feels like it is stuck.  Able to drink fluids and used inhaler.

## 2019-07-02 NOTE — DISCHARGE INSTRUCTIONS
Your gastroeneterologist, Dr. Fenton, is no longer working in area.  Call Suad Dang for referral and follow up

## 2019-07-02 NOTE — ED PROVIDER NOTES
History     Chief Complaint   Patient presents with     Swallowed Foreign Body     HPI  Tri Mejia is a 49 year old female who presents with FB sensation in esophagus or bronchus.  She was eating a cucumber approx 45 min PTA and she felt like she sucked it into her bronchus. She immediately coughed and noted wheezing which she used her inhaler. She has not had further coughing and wheezing but feels irritation to right upper lateral sternal border.  She has been able to drink water and control her secretions. She has had a hx of food bolus in 2011 and reports at that time she did have her esophagus stretched and has been doing well since.     PCP; Suad Dang     Allergies:  Allergies   Allergen Reactions     Sulfa Drugs Hives     bactrim       Problem List:    Patient Active Problem List    Diagnosis Date Noted     Pelvic pain in female 08/01/2016     Priority: Medium     GERD (gastroesophageal reflux disease) 03/20/2012     Priority: Medium     Intermittent asthma 03/20/2012     Priority: Medium     CARDIOVASCULAR SCREENING; LDL GOAL LESS THAN 160 10/31/2010     Priority: Medium     Hemorrhage of rectum and anus 05/10/2007     Priority: Medium     Excessive or frequent menstruation 01/02/2007     Priority: Medium     S/p endometrial ablation       Palpitations 05/31/2006     Priority: Medium     Allergic rhinitis 11/06/2003     Priority: Medium     Problem list name updated by automated process. Provider to review          Past Medical History:    Past Medical History:   Diagnosis Date     Allergic rhinitis, cause unspecified      Depressive disorder, not elsewhere classified      PVC (premature ventricular contraction)      RECTAL & ANAL HEMORRHAGE 5/10/2007     Unspecified asthma(493.90) 1995       Past Surgical History:    Past Surgical History:   Procedure Laterality Date     COLONOSCOPY  05/21/2007    Sm internal hemorrhoids, otherwise normal. Repeat age 50.     ESOPHAGOSCOPY,  GASTROSCOPY, DUODENOSCOPY (EGD), COMBINED  10/3/2011    Procedure:COMBINED ESOPHAGOSCOPY, GASTROSCOPY, DUODENOSCOPY (EGD), BIOPSY SINGLE OR MULTIPLE; esophagogastroduodenoscopy with biopsy and dilatation, MULTIPLE BX; Surgeon:CHARY MORAN; Location:[facility]     ESOPHAGOSCOPY, GASTROSCOPY, DUODENOSCOPY (EGD), DILATATION, COMBINED  10/3/2011    Procedure:COMBINED ESOPHAGOSCOPY, GASTROSCOPY, DUODENOSCOPY (EGD), DILATATION; Surgeon:CHARY MORAN; Location:[facility]     HC HYSTEROSCOPY W ENDOMETRIAL BX/POLYPECTOMY W/WO D&C  2007    Hysteroscopy, D&C     HC REMOVAL OF TONSILS,<11 Y/O  1976    Tonsils <12y.o.       Family History:    Family History   Problem Relation Age of Onset     Lipids Mother      Connective Tissue Disorder Mother         osteopenia     Hypertension Father      Arthritis Father      Lipids Father      Cerebrovascular Disease Paternal Grandmother      Cerebrovascular Disease Paternal Grandfather      Depression Sister      Depression Brother      Lipids Sister      C.A.D. Sister          of MI at 42. obese and developmental delay       Social History:  Marital Status:   [2]  Social History     Tobacco Use     Smoking status: Former Smoker     Last attempt to quit: 2005     Years since quittin.1     Smokeless tobacco: Never Used   Substance Use Topics     Alcohol use: Yes     Comment:  a couple of beers weeklly     Drug use: No        Medications:      albuterol (VENTOLIN HFA) 108 (90 Base) MCG/ACT inhaler   Cetirizine HCl (ZYRTEC PO)   Cholecalciferol (VITAMIN D3 PO)   Multiple Vitamin (MULTI-VITAMIN DAILY PO)   Omeprazole (PRILOSEC PO)         Review of Systems   Constitutional: Negative for fever.   HENT: Negative for drooling, sore throat and trouble swallowing.    Respiratory: Negative for chest tightness and stridor.    Cardiovascular: Negative for chest pain.   Gastrointestinal: Negative for nausea and vomiting.       Physical Exam   BP: 135/65  Heart Rate:  61  Temp: 96.3  F (35.7  C)  Resp: 16  Weight: 64.9 kg (143 lb)  SpO2: 95 %      Physical Exam   Constitutional: She appears well-developed and well-nourished. No distress.   Pleasant female lying on bed at 60 degree angle in no distress   HENT:   Head: Normocephalic and atraumatic.   Mouth/Throat: Uvula is midline, oropharynx is clear and moist and mucous membranes are normal.   Eyes: Conjunctivae are normal.   Neck: Neck supple.   Cardiovascular: Normal rate, regular rhythm and normal heart sounds.   Pulmonary/Chest: Effort normal and breath sounds normal. No stridor. She has no wheezes. She exhibits no crepitus.       Skin: Skin is warm and dry. She is not diaphoretic.   Psychiatric: She has a normal mood and affect.   Nursing note and vitals reviewed.      ED Course  Discussed at length with patient generally small particles of foot such as a cucumber seed can be expelled with forceful cough. Discussed can have residual pain even though object is no longer there. Discussed possible esophageal stricture that can be irritated and cause similar pain.  I have recommended diligent follow up with PCP as xray would not show such a small FB on film.  Only way to see would be via EGD and bronchoscopy.   At this time recommend home observation of symptoms. If high grade fever in few days consider aspirate pneumonia but again in healthy individual less likely.  She is amenable to discharge and follow up with PCP.         Procedures               Critical Care time:  none               No results found for this or any previous visit (from the past 24 hour(s)).    Medications - No data to display    Assessments & Plan (with Medical Decision Making)     I have reviewed the nursing notes.    I have reviewed the findings, diagnosis, plan and need for follow up with the patient.          Medication List      There are no discharge medications for this visit.         Final diagnoses:   Sensation of foreign body in larynx        7/1/2019   [facility] EMERGENCY DEPARTMENT     Odette Gomes, ROMELIA CNP  07/01/19 2122

## 2019-09-08 ENCOUNTER — OFFICE VISIT (OUTPATIENT)
Dept: URGENT CARE | Facility: RETAIL CLINIC | Age: 49
End: 2019-09-08
Payer: COMMERCIAL

## 2019-09-08 VITALS
SYSTOLIC BLOOD PRESSURE: 137 MMHG | OXYGEN SATURATION: 97 % | HEART RATE: 61 BPM | TEMPERATURE: 98.7 F | DIASTOLIC BLOOD PRESSURE: 80 MMHG

## 2019-09-08 DIAGNOSIS — H65.93 OME (OTITIS MEDIA WITH EFFUSION), BILATERAL: Primary | ICD-10-CM

## 2019-09-08 PROCEDURE — 99213 OFFICE O/P EST LOW 20 MIN: CPT | Performed by: NURSE PRACTITIONER

## 2019-09-08 RX ORDER — AMOXICILLIN 875 MG
875 TABLET ORAL 2 TIMES DAILY
Qty: 14 TABLET | Refills: 0 | Status: SHIPPED | OUTPATIENT
Start: 2019-09-08 | End: 2020-01-02

## 2019-09-08 ASSESSMENT — ENCOUNTER SYMPTOMS
FATIGUE: 0
ACTIVITY CHANGE: 0
SINUS PRESSURE: 0
CHILLS: 0
HEADACHES: 0
ADENOPATHY: 0
SLEEP DISTURBANCE: 1
FEVER: 0
SINUS PAIN: 0
DIAPHORESIS: 0

## 2019-09-08 NOTE — PATIENT INSTRUCTIONS
Watchful waiting is recommended for patients who meet the following criteria:  -immunocompetent (without other serious illness)  -2 years old or older  -mild one sided symptoms  -without any ear drainage  -duration is less than 2 days  -temperature less than 102.2F    Should symptoms worsen at any time or persist for 48 hours, antibiotic should be started. A prescription was given today.  Tylenol and/or motrin for pain relief and fever reduction.  Warm compresses next to ear for pain relief.  Drink plenty of fluids and place a humidifier in bedroom.  Mucinex to help reduce fluid in ears (guiafenasin is the generic).  Ear infections are not contagious.  Swimming is ok as long as there is no perforation in the ear drum.   For effusions continue flonase and antihistamines.  Follow up with primary care provider 10-14 days after starting the antibiotic with any concern of persistent infection.

## 2019-09-08 NOTE — PROGRESS NOTES
Chief Complaint   Patient presents with     Ear Problem     left ear pain x 5 days     SUBJECTIVE:  Tri Mejia is a 49 year old female who presents for evaluation of left ear pain and fullness for 5 day(s). Her father's  is this week, she has not been sleeping well, feels awful. Pain in ear is exacerbated by loud noises. She has uncontrolled seasonal allergies.  Severity: moderate   Timing: gradual onset and worsening  Additional symptoms include none.  Treatment measures tried include: Antihistamine.  History of recurrent otitis: yes  Predisposing factors include: None.    Past Medical History:   Diagnosis Date     Allergic rhinitis, cause unspecified     Seasonal     Depressive disorder, not elsewhere classified      PVC (premature ventricular contraction)      RECTAL & ANAL HEMORRHAGE 5/10/2007     Unspecified asthma(493.90)        Current Outpatient Medications on File Prior to Visit:  albuterol (VENTOLIN HFA) 108 (90 Base) MCG/ACT inhaler INHALE 2 PUFFS INTO THE LUNGS EVERY 6 HOURS AS NEEDED FOR SHORTNESS OF BREATH OR WHEEZING   Cetirizine HCl (ZYRTEC PO)    Cholecalciferol (VITAMIN D3 PO) Take by mouth daily   Multiple Vitamin (MULTI-VITAMIN DAILY PO) Take  by mouth.   Omeprazole (PRILOSEC PO) Take by mouth daily     No current facility-administered medications on file prior to visit.   Social History     Tobacco Use     Smoking status: Former Smoker     Last attempt to quit: 2005     Years since quittin.3     Smokeless tobacco: Never Used   Substance Use Topics     Alcohol use: Yes     Comment:  a couple of beers weeklly     Allergies   Allergen Reactions     Sulfa Drugs Hives     bactrim     Review of Systems   Constitutional: Negative for activity change, chills, diaphoresis, fatigue and fever.   HENT: Positive for ear pain. Negative for congestion, ear discharge, hearing loss, sinus pressure, sinus pain and tinnitus.    Allergic/Immunologic: Positive for environmental  allergies.   Neurological: Negative for headaches.   Hematological: Negative for adenopathy.   Psychiatric/Behavioral: Positive for sleep disturbance.     OBJECTIVE:  /80   Pulse 61   Temp 98.7  F (37.1  C) (Oral)   SpO2 97%      Physical Exam   Constitutional: She is oriented to person, place, and time. She appears well-developed and well-nourished. She appears distressed (tearful about loss of father).   HENT:   Head: Normocephalic.   Nose: Nose normal.   Bilateral tympanic membranes with effusions, right has thick white mucoid appearance, mild erythema.   Eyes: Pupils are equal, round, and reactive to light. Conjunctivae and EOM are normal.   Neck: Normal range of motion. Neck supple.   Cardiovascular: Normal rate.   Pulmonary/Chest: Effort normal.   Abdominal: Soft. Bowel sounds are normal. There is no tenderness.   Musculoskeletal: Normal range of motion.   Neurological: She is alert and oriented to person, place, and time.   Skin: Skin is warm and dry. Capillary refill takes less than 2 seconds. She is not diaphoretic. No pallor.   Psychiatric: She has a normal mood and affect. Her behavior is normal.   Vitals reviewed.    ASSESSMENT:    ICD-10-CM    1. OME (otitis media with effusion), bilateral H65.93 amoxicillin (AMOXIL) 875 MG tablet     PLAN:   Patient Instructions   Watchful waiting is recommended for patients who meet the following criteria:  -immunocompetent (without other serious illness)  -2 years old or older  -mild one sided symptoms  -without any ear drainage  -duration is less than 2 days  -temperature less than 102.2F    Should symptoms worsen at any time or persist for 48 hours, antibiotic should be started. A prescription was given today.  Tylenol and/or motrin for pain relief and fever reduction.  Warm compresses next to ear for pain relief.  Drink plenty of fluids and place a humidifier in bedroom.  Mucinex to help reduce fluid in ears (guiafenasin is the generic).  Ear infections  are not contagious.  Swimming is ok as long as there is no perforation in the ear drum.   For effusions continue flonase and antihistamines.  Follow up with primary care provider 10-14 days after starting the antibiotic with any concern of persistent infection.          Follow up with primary care provider with any problems, questions or concerns or if symptoms worsen or fail to improve. Patient agreed to plan and verbalized understanding.    Tami Turpin, NOE-BC  [facility] [place]

## 2020-01-02 ENCOUNTER — OFFICE VISIT (OUTPATIENT)
Dept: URGENT CARE | Facility: RETAIL CLINIC | Age: 50
End: 2020-01-02
Payer: COMMERCIAL

## 2020-01-02 VITALS
TEMPERATURE: 97.8 F | DIASTOLIC BLOOD PRESSURE: 79 MMHG | SYSTOLIC BLOOD PRESSURE: 119 MMHG | OXYGEN SATURATION: 98 % | HEART RATE: 59 BPM

## 2020-01-02 DIAGNOSIS — J01.90 ACUTE SINUSITIS WITH SYMPTOMS > 10 DAYS: Primary | ICD-10-CM

## 2020-01-02 PROCEDURE — 99213 OFFICE O/P EST LOW 20 MIN: CPT | Performed by: FAMILY MEDICINE

## 2020-01-02 RX ORDER — AMOXICILLIN AND CLAVULANATE POTASSIUM 500; 125 MG/1; MG/1
1 TABLET, FILM COATED ORAL 2 TIMES DAILY
Qty: 20 TABLET | Refills: 0 | Status: SHIPPED | OUTPATIENT
Start: 2020-01-02 | End: 2020-03-26

## 2020-01-03 NOTE — PATIENT INSTRUCTIONS
Call to schedule mammogram at [phone]. Your last mammogram was 4/1/19.    Schedule colonoscopy for after you turn 50. You will receive a call to set up an appointment for the colonoscopy scheduling department.    Fasting blood work today.    Suad Dang, CADY-C    Preventive Health Recommendations  Female Ages 40 to 49    Yearly exam:     See your health care provider every year in order to  1. Review health changes.   2. Discuss preventive care.    3. Review your medicines if your doctor prescribed any.      Get a Pap test every three years (unless you have an abnormal result and your provider advises testing more often).      If you get Pap tests with HPV test, you only need to test every 5 years, unless you have an abnormal result. You do not need a Pap test if your uterus was removed (hysterectomy) and you have not had cancer.      You should be tested each year for STDs (sexually transmitted diseases), if you're at risk.     Ask your doctor if you should have a mammogram.      Have a colonoscopy (test for colon cancer) if someone in your family has had colon cancer or polyps before age 50.       Have a cholesterol test every 5 years.       Have a diabetes test (fasting glucose) after age 45. If you are at risk for diabetes, you should have this test every 3 years.    Shots: Get a flu shot each year. Get a tetanus shot every 10 years.     Nutrition:     Eat at least 5 servings of fruits and vegetables each day.    Eat whole-grain bread, whole-wheat pasta and brown rice instead of white grains and rice.    Get adequate Calcium and Vitamin D.      Lifestyle    Exercise at least 150 minutes a week (an average of 30 minutes a day, 5 days a week). This will help you control your weight and prevent disease.    Limit alcohol to one drink per day.    No smoking.     Wear sunscreen to prevent skin cancer.    See your dentist every six months for an exam and cleaning.

## 2020-01-03 NOTE — PROGRESS NOTES
SUBJECTIVE:   CC: Tri Mejia is an 49 year old woman who presents for preventive health visit.     Healthy Habits:     Getting at least 3 servings of Calcium per day:  Yes    Bi-annual eye exam:  Yes    Dental care twice a year:  Yes    Sleep apnea or symptoms of sleep apnea:  None    Diet:  Other    Frequency of exercise:  6-7 days/week    Duration of exercise:  30-45 minutes    Taking medications regularly:  Yes    Medication side effects:  None    PHQ-2 Total Score: 0    Additional concerns today:  Yes (Hip pain x3 weeks)    ACT Total Scores 10/9/2018 2019 2020   ACT TOTAL SCORE - - -   ASTHMA ER VISITS - - -   ASTHMA HOSPITALIZATIONS - - -   ACT TOTAL SCORE (Goal Greater than or Equal to 20) 21 22 21   In the past 12 months, how many times did you visit the emergency room for your asthma without being admitted to the hospital? 0 0 0   In the past 12 months, how many times were you hospitalized overnight because of your asthma? 0 0 0       Today's PHQ-2 Score:   PHQ-2 (  [employer]) 2020   Q1: Little interest or pleasure in doing things 0   Q2: Feeling down, depressed or hopeless 0   PHQ-2 Score 0   Q1: Little interest or pleasure in doing things Not at all   Q2: Feeling down, depressed or hopeless Not at all   PHQ-2 Score 0       Abuse: Current or Past(Physical, Sexual or Emotional)- No  Do you feel safe in your environment? Yes        Social History     Tobacco Use     Smoking status: Former Smoker     Last attempt to quit: 2005     Years since quittin.7     Smokeless tobacco: Never Used   Substance Use Topics     Alcohol use: Yes     Comment:  a couple of beers weeklly         Alcohol Use 2020   Prescreen: >3 drinks/day or >7 drinks/week? No   Prescreen: >3 drinks/day or >7 drinks/week? -   No flowsheet data found.    Reviewed orders with patient.  Reviewed health maintenance and updated orders accordingly - Yes  Lab work is in process  Labs reviewed in EPIC  BP Readings  from Last 3 Encounters:   20 126/80   20 119/79   19 137/80    Wt Readings from Last 3 Encounters:   20 65.5 kg (144 lb 4.8 oz)   19 64.9 kg (143 lb)   19 64.5 kg (142 lb 1.6 oz)                  Patient Active Problem List   Diagnosis     Allergic rhinitis     Palpitations     Excessive or frequent menstruation     Hemorrhage of rectum and anus     CARDIOVASCULAR SCREENING; LDL GOAL LESS THAN 160     GERD (gastroesophageal reflux disease)     Intermittent asthma     Pelvic pain in female     Past Surgical History:   Procedure Laterality Date     COLONOSCOPY  2007    Sm internal hemorrhoids, otherwise normal. Repeat age 50.     ESOPHAGOSCOPY, GASTROSCOPY, DUODENOSCOPY (EGD), COMBINED  10/3/2011    Procedure:COMBINED ESOPHAGOSCOPY, GASTROSCOPY, DUODENOSCOPY (EGD), BIOPSY SINGLE OR MULTIPLE; esophagogastroduodenoscopy with biopsy and dilatation, MULTIPLE BX; Surgeon:CHARY MORAN; Location:[facility]     ESOPHAGOSCOPY, GASTROSCOPY, DUODENOSCOPY (EGD), DILATATION, COMBINED  10/3/2011    Procedure:COMBINED ESOPHAGOSCOPY, GASTROSCOPY, DUODENOSCOPY (EGD), DILATATION; Surgeon:CHARY MORAN; Location:[facility]     HC HYSTEROSCOPY W ENDOMETRIAL BX/POLYPECTOMY W/WO D&C  2007    Hysteroscopy, D&C     HC REMOVAL OF TONSILS,<11 Y/O  1976    Tonsils <12y.o.       Social History     Tobacco Use     Smoking status: Former Smoker     Last attempt to quit: 2005     Years since quittin.7     Smokeless tobacco: Never Used   Substance Use Topics     Alcohol use: Yes     Comment:  a couple of beers weeklly     Family History   Problem Relation Age of Onset     Lipids Mother      Connective Tissue Disorder Mother         osteopenia     Hypertension Father      Arthritis Father      Lipids Father      Cerebrovascular Disease Paternal Grandmother      Cerebrovascular Disease Paternal Grandfather      Depression Sister      Depression Brother      Lipids Sister      C.A.D. Sister           of MI at 42. obese and developmental delay         Current Outpatient Medications   Medication Sig Dispense Refill     albuterol (VENTOLIN HFA) 108 (90 Base) MCG/ACT inhaler INHALE 2 PUFFS INTO THE LUNGS EVERY 6 HOURS AS NEEDED FOR SHORTNESS OF BREATH OR WHEEZING 18 g 5     amoxicillin-clavulanate (AUGMENTIN) 500-125 MG tablet Take 1 tablet by mouth 2 times daily for 10 days 20 tablet 0     Cetirizine HCl (ZYRTEC PO)        Cholecalciferol (VITAMIN D3 PO) Take by mouth daily       Multiple Vitamin (MULTI-VITAMIN DAILY PO) Take  by mouth.       Omeprazole (PRILOSEC PO) Take by mouth daily       Allergies   Allergen Reactions     Sulfa Drugs Hives     bactrim     Recent Labs   Lab Test 20  0903 10/12/18  0923 17  0807   A1C  --   --  5.4   LDL 95 155* 129*   HDL 61 51 55   TRIG 117 86 106   CR 0.69 0.76  --    GFRESTIMATED >90 82  --    GFRESTBLACK >90 >90  --    POTASSIUM 4.3 3.8  --         Mammogram Screening: Patient under age 50, mutual decision reflected in health maintenance.      Pertinent mammograms are reviewed under the imaging tab.  History of abnormal Pap smear: NO - age 30-65 PAP every 5 years with negative HPV co-testing recommended  PAP / HPV Latest Ref Rng & Units 10/9/2018 2015 3/20/2012   PAP - NIL NIL NIL   HPV 16 DNA NEG:Negative Negative Negative -   HPV 18 DNA NEG:Negative Negative Negative -   OTHER HR HPV NEG:Negative Negative Negative -     Reviewed and updated as needed this visit by clinical staff  Tobacco  Allergies  Meds  Med Hx  Surg Hx  Fam Hx  Soc Hx        Reviewed and updated as needed this visit by Provider        Past Medical History:   Diagnosis Date     Allergic rhinitis, cause unspecified     Seasonal     Depressive disorder, not elsewhere classified      PVC (premature ventricular contraction)      RECTAL & ANAL HEMORRHAGE 5/10/2007     Unspecified asthma(493.90)       Past Surgical History:   Procedure Laterality Date     COLONOSCOPY   2007    Sm internal hemorrhoids, otherwise normal. Repeat age 50.     ESOPHAGOSCOPY, GASTROSCOPY, DUODENOSCOPY (EGD), COMBINED  10/3/2011    Procedure:COMBINED ESOPHAGOSCOPY, GASTROSCOPY, DUODENOSCOPY (EGD), BIOPSY SINGLE OR MULTIPLE; esophagogastroduodenoscopy with biopsy and dilatation, MULTIPLE BX; Surgeon:CHARY MORAN; Location:[facility]     ESOPHAGOSCOPY, GASTROSCOPY, DUODENOSCOPY (EGD), DILATATION, COMBINED  10/3/2011    Procedure:COMBINED ESOPHAGOSCOPY, GASTROSCOPY, DUODENOSCOPY (EGD), DILATATION; Surgeon:CHARY MORAN; Location:[facility]     HC HYSTEROSCOPY W ENDOMETRIAL BX/POLYPECTOMY W/WO D&C  2007    Hysteroscopy, D&C     HC REMOVAL OF TONSILS,<13 Y/O  1976    Tonsils <12y.o.     OB History    Para Term  AB Living   1 1 0 1 0 1   SAB TAB Ectopic Multiple Live Births   0 0 0 0 1      # Outcome Date GA Lbr Manny/2nd Weight Sex Delivery Anes PTL Lv   1  03 34w0d  1.701 kg (3 lb 12 oz) F CS   BRANDY      Birth Comments: Delivered early due to Preeclampsia, abruption with acute bleeding      Apgar1: 5  Apgar5: 8       Review of Systems   Constitutional: Negative for chills and fever.   HENT: Negative for congestion, ear pain, hearing loss and sore throat.    Eyes: Negative for pain and visual disturbance.   Respiratory: Negative for cough and shortness of breath.    Cardiovascular: Negative for chest pain, palpitations and peripheral edema.   Gastrointestinal: Negative for abdominal pain, constipation, diarrhea, heartburn, hematochezia and nausea.   Breasts:  Negative for tenderness, breast mass and discharge.   Genitourinary: Negative for dysuria, frequency, genital sores, hematuria, pelvic pain, urgency, vaginal bleeding and vaginal discharge.   Musculoskeletal: Positive for arthralgias and myalgias. Negative for joint swelling.   Skin: Negative for rash.   Neurological: Negative for dizziness, weakness, headaches and paresthesias.   Psychiatric/Behavioral: Negative for  "mood changes. The patient is not nervous/anxious.         OBJECTIVE:   /80   Pulse 59   Temp 97.7  F (36.5  C) (Temporal)   Resp 16   Ht 1.571 m (5' 1.85\")   Wt 65.5 kg (144 lb 4.8 oz)   BMI 26.52 kg/m    Physical Exam  GENERAL: healthy, alert and no distress  EYES: Eyes grossly normal to inspection, PERRL and conjunctivae and sclerae normal  HENT: ear canals and TM's normal, nose and mouth without ulcers or lesions  NECK: no adenopathy, no asymmetry, masses, or scars and thyroid normal to palpation  RESP: lungs clear to auscultation - no rales, rhonchi or wheezes  BREAST: normal without masses, tenderness or nipple discharge and no palpable axillary masses or adenopathy  CV: regular rate and rhythm, normal S1 S2, no S3 or S4, no murmur, click or rub, no peripheral edema and peripheral pulses strong  ABDOMEN: soft, nontender, no hepatosplenomegaly, no masses and bowel sounds normal  MS: no gross musculoskeletal defects noted, no edema  SKIN: no suspicious lesions or rashes  NEURO: Normal strength and tone, mentation intact and speech normal  PSYCH: mentation appears normal, affect normal/bright    Diagnostic Test Results:  Labs reviewed in Epic  Results for orders placed or performed in visit on 01/08/20 (from the past 24 hour(s))   Lipid panel reflex to direct LDL Fasting   Result Value Ref Range    Cholesterol 179 <200 mg/dL    Triglycerides 117 <150 mg/dL    HDL Cholesterol 61 >49 mg/dL    LDL Cholesterol Calculated 95 <100 mg/dL    Non HDL Cholesterol 118 <130 mg/dL   Basic metabolic panel   Result Value Ref Range    Sodium 143 133 - 144 mmol/L    Potassium 4.3 3.4 - 5.3 mmol/L    Chloride 110 (H) 94 - 109 mmol/L    Carbon Dioxide 27 20 - 32 mmol/L    Anion Gap 6 3 - 14 mmol/L    Glucose 96 70 - 99 mg/dL    Urea Nitrogen 13 7 - 30 mg/dL    Creatinine 0.69 0.52 - 1.04 mg/dL    GFR Estimate >90 >60 mL/min/[1.73_m2]    GFR Estimate If Black >90 >60 mL/min/[1.73_m2]    Calcium 9.0 8.5 - 10.1 mg/dL " "      ASSESSMENT/PLAN:   1. Routine general medical examination at a health care facility  Healthy female patient.     2. Lipid screening  - Lipid panel reflex to direct LDL Fasting    3. Screening for diabetes mellitus  - Basic metabolic panel    4. Encounter for screening mammogram for breast cancer  - *MA Screening Digital Bilateral; Future    5. Special screening for malignant neoplasms, colon  - GASTROENTEROLOGY ADULT REF PROCEDURE ONLY    COUNSELING:  Reviewed preventive health counseling, as reflected in patient instructions       Regular exercise       Healthy diet/nutrition       Immunizations    Declined: Influenza due to Concerns about side effects/safety               Colon cancer screening    Estimated body mass index is 26.52 kg/m  as calculated from the following:    Height as of this encounter: 1.571 m (5' 1.85\").    Weight as of this encounter: 65.5 kg (144 lb 4.8 oz).    Weight management plan: Discussed healthy diet and exercise guidelines     reports that she quit smoking about 14 years ago. She has never used smokeless tobacco.      Counseling Resources:  ATP IV Guidelines  Pooled Cohorts Equation Calculator  Breast Cancer Risk Calculator  FRAX Risk Assessment  ICSI Preventive Guidelines  Dietary Guidelines for Americans, 2010  USDA's MyPlate  ASA Prophylaxis  Lung CA Screening    Suad Dang, ROMELIA [facility]  "

## 2020-01-03 NOTE — PROGRESS NOTES
SUBJECTIVE:  Tri Mejia is a 49 year old female here with concerns about sinus infection.  She states onset of symptoms were 2 week(s) ago.  She has had maxillary, frontal pressure. Course of illness is worsening. Severity moderately severe  Current and Associated symptoms: nasal congestion, rhinorrhea, cough , facial pain/pressure and post-nasal drainage  Predisposing factors include HX of recurrent sinusitis. Recent treatment has included: Decongestants and OTC meds    Past Medical History:   Diagnosis Date     Allergic rhinitis, cause unspecified     Seasonal     Depressive disorder, not elsewhere classified      PVC (premature ventricular contraction)      RECTAL & ANAL HEMORRHAGE 5/10/2007     Unspecified asthma(493.90) 1995     Current Outpatient Medications   Medication Sig Dispense Refill     albuterol (VENTOLIN HFA) 108 (90 Base) MCG/ACT inhaler INHALE 2 PUFFS INTO THE LUNGS EVERY 6 HOURS AS NEEDED FOR SHORTNESS OF BREATH OR WHEEZING 18 g 5     amoxicillin-clavulanate (AUGMENTIN) 500-125 MG tablet Take 1 tablet by mouth 2 times daily for 10 days 20 tablet 0     Cetirizine HCl (ZYRTEC PO)        Cholecalciferol (VITAMIN D3 PO) Take by mouth daily       Multiple Vitamin (MULTI-VITAMIN DAILY PO) Take  by mouth.       Omeprazole (PRILOSEC PO) Take by mouth daily       History   Smoking Status     Former Smoker     Quit date: 4/26/2005   Smokeless Tobacco     Never Used       ROS:  Review of systems negative except as stated above.    OBJECTIVE:  /79 (BP Location: Left arm)   Pulse 59   Temp 97.8  F (36.6  C) (Oral)   SpO2 98%   Exam:GENERAL APPEARANCE: alert, mild distress and cooperative  EYES: EOMI,  PERRL, conjunctiva clear  HENT: TM's normal bilaterally, nasal turbinates erythematous, swollen and oral mucous membranes moist, no erythema noted  NECK: supple, nontender, no lymphadenopathy  RESP: lungs clear to auscultation - no rales, rhonchi or wheezes  CV: regular rates and rhythm, normal  S1 S2, no murmur noted  ABDOMEN:  soft, nontender, no HSM or masses and bowel sounds normal  NEURO: Normal strength and tone, sensory exam grossly normal,  normal speech and mentation  SKIN: no suspicious lesions or rashes    ASSESSMENT:  Sinusitis    PLAN:  Augmenting 500 mg bid for 10 days.    Follow up with primary care provider if not improving.

## 2020-01-08 ENCOUNTER — OFFICE VISIT (OUTPATIENT)
Dept: FAMILY MEDICINE | Facility: OTHER | Age: 50
End: 2020-01-08
Payer: COMMERCIAL

## 2020-01-08 ENCOUNTER — TELEPHONE (OUTPATIENT)
Dept: FAMILY MEDICINE | Facility: OTHER | Age: 50
End: 2020-01-08

## 2020-01-08 ENCOUNTER — HOSPITAL ENCOUNTER (OUTPATIENT)
Facility: CLINIC | Age: 50
End: 2020-01-08
Attending: FAMILY MEDICINE | Admitting: FAMILY MEDICINE
Payer: COMMERCIAL

## 2020-01-08 VITALS
TEMPERATURE: 97.7 F | DIASTOLIC BLOOD PRESSURE: 80 MMHG | WEIGHT: 144.3 LBS | HEART RATE: 59 BPM | HEIGHT: 62 IN | RESPIRATION RATE: 16 BRPM | SYSTOLIC BLOOD PRESSURE: 126 MMHG | BODY MASS INDEX: 26.55 KG/M2

## 2020-01-08 DIAGNOSIS — Z13.220 LIPID SCREENING: ICD-10-CM

## 2020-01-08 DIAGNOSIS — Z12.11 SPECIAL SCREENING FOR MALIGNANT NEOPLASMS, COLON: ICD-10-CM

## 2020-01-08 DIAGNOSIS — Z13.1 SCREENING FOR DIABETES MELLITUS: ICD-10-CM

## 2020-01-08 DIAGNOSIS — Z12.31 ENCOUNTER FOR SCREENING MAMMOGRAM FOR BREAST CANCER: ICD-10-CM

## 2020-01-08 DIAGNOSIS — Z00.00 ROUTINE GENERAL MEDICAL EXAMINATION AT A HEALTH CARE FACILITY: Primary | ICD-10-CM

## 2020-01-08 LAB
ANION GAP SERPL CALCULATED.3IONS-SCNC: 6 MMOL/L (ref 3–14)
BUN SERPL-MCNC: 13 MG/DL (ref 7–30)
CALCIUM SERPL-MCNC: 9 MG/DL (ref 8.5–10.1)
CHLORIDE SERPL-SCNC: 110 MMOL/L (ref 94–109)
CHOLEST SERPL-MCNC: 179 MG/DL
CO2 SERPL-SCNC: 27 MMOL/L (ref 20–32)
CREAT SERPL-MCNC: 0.69 MG/DL (ref 0.52–1.04)
GFR SERPL CREATININE-BSD FRML MDRD: >90 ML/MIN/{1.73_M2}
GLUCOSE SERPL-MCNC: 96 MG/DL (ref 70–99)
HDLC SERPL-MCNC: 61 MG/DL
LDLC SERPL CALC-MCNC: 95 MG/DL
NONHDLC SERPL-MCNC: 118 MG/DL
POTASSIUM SERPL-SCNC: 4.3 MMOL/L (ref 3.4–5.3)
SODIUM SERPL-SCNC: 143 MMOL/L (ref 133–144)
TRIGL SERPL-MCNC: 117 MG/DL

## 2020-01-08 PROCEDURE — 99396 PREV VISIT EST AGE 40-64: CPT | Performed by: STUDENT IN AN ORGANIZED HEALTH CARE EDUCATION/TRAINING PROGRAM

## 2020-01-08 PROCEDURE — 80061 LIPID PANEL: CPT | Performed by: STUDENT IN AN ORGANIZED HEALTH CARE EDUCATION/TRAINING PROGRAM

## 2020-01-08 PROCEDURE — 80048 BASIC METABOLIC PNL TOTAL CA: CPT | Performed by: STUDENT IN AN ORGANIZED HEALTH CARE EDUCATION/TRAINING PROGRAM

## 2020-01-08 PROCEDURE — 36415 COLL VENOUS BLD VENIPUNCTURE: CPT | Performed by: STUDENT IN AN ORGANIZED HEALTH CARE EDUCATION/TRAINING PROGRAM

## 2020-01-08 ASSESSMENT — ENCOUNTER SYMPTOMS
CHILLS: 0
CONSTIPATION: 0
SHORTNESS OF BREATH: 0
COUGH: 0
BREAST MASS: 0
EYE PAIN: 0
HEMATOCHEZIA: 0
DIZZINESS: 0
PALPITATIONS: 0
FEVER: 0
PARESTHESIAS: 0
DIARRHEA: 0
ARTHRALGIAS: 1
NERVOUS/ANXIOUS: 0
HEADACHES: 0
SORE THROAT: 0
HEMATURIA: 0
DYSURIA: 0
HEARTBURN: 0
FREQUENCY: 0
NAUSEA: 0
ABDOMINAL PAIN: 0
JOINT SWELLING: 0
MYALGIAS: 1
WEAKNESS: 0

## 2020-01-08 ASSESSMENT — MIFFLIN-ST. JEOR: SCORE: 1230.41

## 2020-01-08 NOTE — LETTER

## 2020-01-08 NOTE — TELEPHONE ENCOUNTER
.Date of colonoscopy/EGD: 5/18  Surgeon: Dr. Wilkins  Prep:Miralax  Packet:Colonoscopy/EGD instructions mailed to patient's home address.   Date: 1/8/2020      Surgery Scheduler

## 2020-01-09 ASSESSMENT — ASTHMA QUESTIONNAIRES: ACT_TOTALSCORE: 21

## 2020-01-09 NOTE — TELEPHONE ENCOUNTER
Left message for patient to return call to clinic. When call is returned please inform patient that form has been completed and faxed.   Also, please relay normal results below.    Misbah Fam,     __    Notes recorded by Suad Dang, ROMELIA CNP on 1/8/2020 at 8:48 PM CST  Please call Tri and let her know her cholesterol levels have improved quite a bit since last year so she should keep doing what she is doing in terms of diet and exercise! The rest of her labs also look great.  Lorna Dang, CNP

## 2020-01-10 DIAGNOSIS — J45.20 INTERMITTENT ASTHMA, UNCOMPLICATED: ICD-10-CM

## 2020-01-10 NOTE — TELEPHONE ENCOUNTER
Medication pending and pharmacy entered. Routing to refill pool for RNs to review.    Yenny Garcia CMA (AAMA)

## 2020-01-10 NOTE — TELEPHONE ENCOUNTER
Reason for Call:  Medication or medication refill:    Do you use a [facility]?  Name of the pharmacy and phone number for the current request:  [facility] - [phone]    Name of the medication requested: albuterol (VENTOLIN HFA) 108 (90 Base) MCG/ACT inhaler       Other request: please call when sent to pharmacy    Can we leave a detailed message on this number? YES    Phone number patient can be reached at: Home number on file [phone] (home)    Best Time: any    Call taken on 1/10/2020 at 11:37 AM by Everett Callejas

## 2020-01-13 RX ORDER — ALBUTEROL SULFATE 90 UG/1
AEROSOL, METERED RESPIRATORY (INHALATION)
Qty: 18 G | Refills: 1 | Status: SHIPPED | OUTPATIENT
Start: 2020-01-13 | End: 2020-04-29

## 2020-01-13 NOTE — TELEPHONE ENCOUNTER
Pending Prescriptions:                       Disp   Refills    albuterol (VENTOLIN HFA) 108 (90 Base) MC*18 g   1            Sig: INHALE 2 PUFFS INTO THE LUNGS EVERY 6 HOURS AS           NEEDED FOR SHORTNESS OF BREATH OR WHEEZING    Prescription approved per [facility] Protocol.    Rosi Kennedy, RN, BSN

## 2020-01-14 ENCOUNTER — TELEPHONE (OUTPATIENT)
Dept: FAMILY MEDICINE | Facility: OTHER | Age: 50
End: 2020-01-14

## 2020-01-14 NOTE — TELEPHONE ENCOUNTER
Patient called and stated she had a form filled out at her physical and was faxed to [employer]. They are rejecting it because there is not date.   Reprinted for and dated for and resent form to them.     Misbah Fam,

## 2020-03-26 ENCOUNTER — VIRTUAL VISIT (OUTPATIENT)
Dept: FAMILY MEDICINE | Facility: OTHER | Age: 50
End: 2020-03-26
Payer: COMMERCIAL

## 2020-03-26 DIAGNOSIS — J01.00 SUBACUTE MAXILLARY SINUSITIS: Primary | ICD-10-CM

## 2020-03-26 PROCEDURE — 99213 OFFICE O/P EST LOW 20 MIN: CPT | Mod: TEL | Performed by: PHYSICIAN ASSISTANT

## 2020-03-26 RX ORDER — CHLORAL HYDRATE 500 MG
1 CAPSULE ORAL DAILY
COMMUNITY

## 2020-03-26 NOTE — PROGRESS NOTES
"Tri Mejia is a 49 year old female who is being evaluated via a billable telephone visit.      The patient has been notified of following:     \"This telephone visit will be conducted via a call between you and your physician/provider. We have found that certain health care needs can be provided without the need for a physical exam.  This service lets us provide the care you need with a short phone conversation.  If a prescription is necessary we can send it directly to your pharmacy.  If lab work is needed we can place an order for that and you can then stop by our lab to have the test done at a later time.    If during the course of the call the physician/provider feels a telephone visit is not appropriate, you will not be charged for this service.\"     Tri Mejia complains of   Chief Complaint   Patient presents with     Otalgia     bilateral       I have reviewed and updated the patient's Past Medical History, Social History, Family History and Medication List.    ALLERGIES  Sulfa drugs    0300 bilateral ear pain work her up   +N/V with pseudoephedrine  Patient does not have a fever.  Her sense of smell and sense of taste have been maintained.  She has been able to eat well today without any further problems.  She denies much for cough and does not have body aches necessarily she does question whether or not she could have COVID-19 at this point time based on her signs and symptoms I cannot guarantee that she does not have COVID-19 unlikely based on what she can tell me.    Assessment/Plan:  1. Subacute maxillary sinusitis  Treat with antibiotics that worked well for her back in January of this year advised to isolate and take care of her self  - amoxicillin-clavulanate (AUGMENTIN) 875-125 MG tablet; Take 1 tablet by mouth 2 times daily for 10 days  Dispense: 20 tablet; Refill: 0    Phone call duration:  6 minutes    Benito Childress PA-C    "

## 2020-04-29 ENCOUNTER — TELEPHONE (OUTPATIENT)
Dept: FAMILY MEDICINE | Facility: OTHER | Age: 50
End: 2020-04-29

## 2020-04-29 DIAGNOSIS — J45.20 INTERMITTENT ASTHMA, UNCOMPLICATED: ICD-10-CM

## 2020-04-29 RX ORDER — ALBUTEROL SULFATE 90 UG/1
AEROSOL, METERED RESPIRATORY (INHALATION)
Qty: 3 INHALER | Refills: 1 | Status: SHIPPED | OUTPATIENT
Start: 2020-04-29 | End: 2021-01-18

## 2020-04-29 NOTE — TELEPHONE ENCOUNTER
Reason for Call:  Medication or medication refill:    Do you use a [facility]?  Name of the pharmacy and phone number for the current request:  [id number] [address] [facility]     Name of the medication requested: albuterol (VENTOLIN HFA) 108 (90 Base) MCG/ACT inhaler     Other request: Pt called and is requesting a refill on this medication. She said she has about 2 weeks left and she is requesting 6 months to a year for this refill. Please advise thank you    Can we leave a detailed message on this number? YES    Phone number patient can be reached at: Home number on file [phone] (home)    Best Time: anytime    Call taken on 4/29/2020 at 9:19 AM by Isabella Bridges

## 2020-04-29 NOTE — TELEPHONE ENCOUNTER
ACT Total Scores 10/9/2018 5/21/2019 1/8/2020   ACT TOTAL SCORE - - -   ASTHMA ER VISITS - - -   ASTHMA HOSPITALIZATIONS - - -   ACT TOTAL SCORE (Goal Greater than or Equal to 20) 21 22 21   In the past 12 months, how many times did you visit the emergency room for your asthma without being admitted to the hospital? 0 0 0   In the past 12 months, how many times were you hospitalized overnight because of your asthma? 0 0 0

## 2020-05-26 NOTE — TELEPHONE ENCOUNTER
Please take off schedule for 6/1 as we aren't doing screening colonoscopies. Patient wants to wait a little bit anyway.

## 2020-07-15 NOTE — PROGRESS NOTES
"Tri Mejia is a 50 year old female who is being evaluated via a billable telephone visit.      The patient has been notified of following:     \"This telephone visit will be conducted via a call between you and your physician/provider. We have found that certain health care needs can be provided without the need for a physical exam.  This service lets us provide the care you need with a short phone conversation.  If a prescription is necessary we can send it directly to your pharmacy.  If lab work is needed we can place an order for that and you can then stop by our lab to have the test done at a later time.    Telephone visits are billed at different rates depending on your insurance coverage. During this emergency period, for some insurers they may be billed the same as an in-person visit.  Please reach out to your insurance provider with any questions.    If during the course of the call the physician/provider feels a telephone visit is not appropriate, you will not be charged for this service.\"    Patient has given verbal consent for Telephone visit?  Yes    What phone number would you like to be contacted at?   [phone]    How would you like to obtain your AVS? Mail a copy    Subjective     Tri Mejia is a 50 year old female who presents via phone visit today for the following health issues:    HPI    Acute Illness   Acute illness concerns: Possible sinus infection   Onset:3 weeks     Fever: no     Chills/Sweats: no     Headache (location?): YES    Sinus Pressure:no    Conjunctivitis:  no    Ear Pain: YES: Bilateral ear pain and fluid in the ears.     Rhinorrhea: no     Congestion: YES    Sore Throat: no      Cough: no    Wheeze: no     Decreased Appetite: no     Nausea: no     Vomiting: no     Diarrhea:  no     Dysuria/Freq.: no     Fatigue/Achiness: YES. fatigue only.     Sick/Strep Exposure: no      Therapies Tried and outcome: Sudafed       Patient Active Problem List   Diagnosis     " Allergic rhinitis     Palpitations     Excessive or frequent menstruation     Hemorrhage of rectum and anus     CARDIOVASCULAR SCREENING; LDL GOAL LESS THAN 160     GERD (gastroesophageal reflux disease)     Intermittent asthma     Pelvic pain in female     Past Surgical History:   Procedure Laterality Date     COLONOSCOPY  2007    Sm internal hemorrhoids, otherwise normal. Repeat age 50.     ESOPHAGOSCOPY, GASTROSCOPY, DUODENOSCOPY (EGD), COMBINED  10/3/2011    Procedure:COMBINED ESOPHAGOSCOPY, GASTROSCOPY, DUODENOSCOPY (EGD), BIOPSY SINGLE OR MULTIPLE; esophagogastroduodenoscopy with biopsy and dilatation, MULTIPLE BX; Surgeon:CHARY MORAN; Location[facility]     ESOPHAGOSCOPY, GASTROSCOPY, DUODENOSCOPY (EGD), DILATATION, COMBINED  10/3/2011    Procedure:COMBINED ESOPHAGOSCOPY, GASTROSCOPY, DUODENOSCOPY (EGD), DILATATION; Surgeon:CHARY MORAN; Location:[facility]     HC HYSTEROSCOPY W ENDOMETRIAL BX/POLYPECTOMY W/WO D&C  2007    Hysteroscopy, D&C     HC REMOVAL OF TONSILS,<13 Y/O  1976    Tonsils <12y.o.       Social History     Tobacco Use     Smoking status: Former Smoker     Last attempt to quit: 2005     Years since quitting: 15.2     Smokeless tobacco: Never Used   Substance Use Topics     Alcohol use: Yes     Comment:  a couple of beers weeklly     Family History   Problem Relation Age of Onset     Lipids Mother      Connective Tissue Disorder Mother         osteopenia     Hypertension Father      Arthritis Father      Lipids Father      Cerebrovascular Disease Paternal Grandmother      Cerebrovascular Disease Paternal Grandfather      Depression Sister      Depression Brother      Lipids Sister      C.A.D. Sister          of MI at 42. obese and developmental delay         Current Outpatient Medications   Medication Sig Dispense Refill     albuterol (VENTOLIN HFA) 108 (90 Base) MCG/ACT inhaler INHALE 2 PUFFS INTO THE LUNGS EVERY 6 HOURS AS NEEDED FOR SHORTNESS OF BREATH OR WHEEZING 3  Inhaler 1     amoxicillin-clavulanate (AUGMENTIN) 875-125 MG tablet Take 1 tablet by mouth 2 times daily for 10 days 20 tablet 0     Cetirizine HCl (ZYRTEC PO)        Cholecalciferol (VITAMIN D3 PO) Take by mouth daily       fish oil-omega-3 fatty acids 1000 MG capsule Take 1 g by mouth daily       Multiple Vitamin (MULTI-VITAMIN DAILY PO) Take  by mouth.       Omeprazole (PRILOSEC PO) Take by mouth daily       Allergies   Allergen Reactions     Sulfa Drugs Hives     bactrim     Recent Labs   Lab Test 01/08/20  0903 10/12/18  0923 02/21/17  0807   A1C  --   --  5.4   LDL 95 155* 129*   HDL 61 51 55   TRIG 117 86 106   CR 0.69 0.76  --    GFRESTIMATED >90 82  --    GFRESTBLACK >90 >90  --    POTASSIUM 4.3 3.8  --       BP Readings from Last 3 Encounters:   01/08/20 126/80   01/02/20 119/79   09/08/19 137/80    Wt Readings from Last 3 Encounters:   01/08/20 65.5 kg (144 lb 4.8 oz)   07/01/19 64.9 kg (143 lb)   05/24/19 64.5 kg (142 lb 1.6 oz)                    Reviewed and updated as needed this visit by Provider         Review of Systems   Constitutional, HEENT, cardiovascular, pulmonary, gi and gu systems are negative, except as otherwise noted.       Objective   Reported vitals:  There were no vitals taken for this visit.   healthy, alert and no distress  PSYCH: Alert and oriented times 3; coherent speech, normal   rate and volume, able to articulate logical thoughts, able   to abstract reason, no tangential thoughts, no hallucinations   or delusions  Her affect is normal, pleasant and full  RESP: No cough, no audible wheezing, able to talk in full sentences  Remainder of exam unable to be completed due to telephone visits    Diagnostic Test Results:  Labs reviewed in Epic        Assessment/Plan:    1. Acute sinusitis with symptoms > 10 days  30-year-old female with 21-day history of worsening left maxillary pain, she has had sinusitis in the past and was treated.  Symptoms today are similar to those.  We will  start Augmentin.  She will follow-up face-to-face if no improvement or any worsening.  - amoxicillin-clavulanate (AUGMENTIN) 875-125 MG tablet; Take 1 tablet by mouth 2 times daily for 10 days  Dispense: 20 tablet; Refill: 0    2. Encounter for screening mammogram for breast cancer  Due for mammogram, ordered today.  - MA SCREENING DIGITAL BILAT - Future  (s+30); Future    Return in about 3 months (around 10/16/2020) for Annual Well Check.      Phone call duration:  8 minutes    Shahbaz Tellez MD

## 2020-07-16 ENCOUNTER — VIRTUAL VISIT (OUTPATIENT)
Dept: FAMILY MEDICINE | Facility: OTHER | Age: 50
End: 2020-07-16
Payer: COMMERCIAL

## 2020-07-16 DIAGNOSIS — J01.90 ACUTE SINUSITIS WITH SYMPTOMS > 10 DAYS: Primary | ICD-10-CM

## 2020-07-16 DIAGNOSIS — Z12.31 ENCOUNTER FOR SCREENING MAMMOGRAM FOR BREAST CANCER: ICD-10-CM

## 2020-07-16 PROCEDURE — 99213 OFFICE O/P EST LOW 20 MIN: CPT | Mod: 95 | Performed by: FAMILY MEDICINE

## 2020-07-16 NOTE — PATIENT INSTRUCTIONS
Patient Education     Acute Sinusitis    Acute sinusitis is irritation and swelling of the sinuses. It is usually caused by a viral infection after a common cold. Your doctor can help you find relief.  What is acute sinusitis?  Sinuses are air-filled spaces in the skull behind the face. They are kept moist and clean by a lining of mucosa. Things such as pollen, smoke, and chemical fumes can irritate the mucosa. It can then swell up. As a response to irritation, the mucosa makes more mucus and other fluids. Tiny hairlike cilia cover the mucosa. Cilia help carry mucus toward the opening of the sinus. Too much mucus may cause the cilia to stop working. This blocks the sinus opening. A buildup of fluid in the sinuses then causes pain and pressure. It can also encourage bacteria to grow in the sinuses.  Common symptoms of acute sinusitis  You may have:    Facial soreness pain    Headache    Fever    Fluid draining in the back of the throat (postnasal drip)    Congestion    Drainage that is thick and colored, instead of clear    Cough  Diagnosing acute sinusitis  Your doctor will ask about your symptoms and health history. He or she will look at your ear, nose, and throat. You usually won't need to have X-rays taken.    The doctor may take a sample of mucus to check for bacteria. If you have sinusitis that keeps coming back, you may need imaging tests such as X-rays or CAT scans. This will help your doctor check for a structural problem that may be causing the infection.  Treating acute sinusitis  Treatment is aimed at unblocking the sinus opening and helping the cilia work again. You may need to take antihistamine and decongestant medicine. These can reduce inflammation and decrease the amount of fluid your sinuses make. If you have a bacterial infection, you will need to take antibiotic medicine for 10 to 14 days. Take this medicine until it is gone, even if you feel better.  Date Last Reviewed: 10/1/2016    [id number]  [employer]. [address] [place] [address], [place] [address]. All rights reserved. This information is not intended as a substitute for professional medical care. Always follow your healthcare professional's instructions.           Patient Education     Preventing Sinusitis    Colds, flu, and allergies make it more likely for you to get sinusitis. Do your best to prevent sinusitis by preventing these problems. Do what you can to avoid getting colds and other infections. Stay away from things that cause allergies (allergens). Keep your sinuses as moist as you can.  Tips for air travel  When traveling on an airplane, use saline nasal spray to keep your sinuses moist. Drink plenty of fluids. You may also want to take a decongestant before you get on the plane.   Prevent colds  Do what you can to avoid being exposed to colds and flu. When possible, take more time to rest when you feel something  coming on.     Wash your hands often. This is especially important during cold and flu season. Try not to touch your face.    As much as possible, stay away from infected people.    Follow these standbys for staying healthy: Eat balanced meals, exercise regularly, and get plenty of sleep.  Stay away from allergens  First find out what things you re allergic to. Then take steps to stay away from allergens or irritants in the air such as dust, pollution, and pollen.    Wear a mask when you clean. Or consider hiring a  to help you stay away from dust.    Sit in the nonsmoking sections of restaurants.    Don't go outdoors during peak pollution hours such as rush hour.    Keep an air conditioner on during allergy season. Clean its filter regularly.    Ask your healthcare provider about a referral to have an allergy evaluation. Or ask for a referral to see an allergy specialist.  Boost moisture  Keeping your sinuses moist makes your mucus thinner. This allows your sinuses to drain better. And this helps prevent  infection. Ask your doctor about these suggestions:    Use a humidifier. Clean it often to remove any mold or mildew.    Drink several glasses of water a day.    Stay away from drying beverages such as alcohol and coffee.    Stay away from all types of smoke, which dries out sinus linings. This includes tobacco smoke and chemical smoke in workplace settings.    Use saltwater rinses.  Date Last Reviewed: 10/1/2016    [id number] [employer]. [address] [place] [address], [place] [id number]. All rights reserved. This information is not intended as a substitute for professional medical care. Always follow your healthcare professional's instructions.

## 2020-08-11 ENCOUNTER — HOSPITAL ENCOUNTER (OUTPATIENT)
Dept: MAMMOGRAPHY | Facility: CLINIC | Age: 50
Discharge: HOME | End: 2020-08-11
Attending: FAMILY MEDICINE | Admitting: FAMILY MEDICINE
Payer: COMMERCIAL

## 2020-08-11 DIAGNOSIS — Z12.31 VISIT FOR SCREENING MAMMOGRAM: ICD-10-CM

## 2020-08-11 PROCEDURE — 77067 SCR MAMMO BI INCL CAD: CPT

## 2020-09-01 ENCOUNTER — HOSPITAL ENCOUNTER (OUTPATIENT)
Facility: CLINIC | Age: 50
End: 2020-09-01
Attending: FAMILY MEDICINE | Admitting: FAMILY MEDICINE
Payer: COMMERCIAL

## 2020-09-01 ENCOUNTER — TELEPHONE (OUTPATIENT)
Dept: FAMILY MEDICINE | Facility: OTHER | Age: 50
End: 2020-09-01

## 2020-09-01 DIAGNOSIS — Z11.59 ENCOUNTER FOR SCREENING FOR OTHER VIRAL DISEASES: Primary | ICD-10-CM

## 2020-09-01 NOTE — LETTER
[facility]  [address]  [place] [id number] [phone]        September 1, 2020    Tri Mejia  [address] [facility]  [place] [address]

## 2020-09-01 NOTE — LETTER

## 2020-09-01 NOTE — TELEPHONE ENCOUNTER
Date of colonoscopy/EGD: 10/05/20  Surgeon: Dr. Wilkins  Prep:Miralax  Packet:Colonoscopy/EGD instructions mailed to patient's home address.   Date: 9/1/2020      Surgery Scheduler

## 2020-10-05 ENCOUNTER — TELEPHONE (OUTPATIENT)
Dept: FAMILY MEDICINE | Facility: OTHER | Age: 50
End: 2020-10-05

## 2020-10-05 NOTE — TELEPHONE ENCOUNTER
Date of colonoscopy/EGD: 11/9  Surgeon: Dr. Wilkins  Prep:Miralax  Packet:Colonoscopy/EGD instructions mailed to patient's home address.   Date: 10/5/2020      Surgery Scheduler

## 2020-10-08 DIAGNOSIS — Z11.59 ENCOUNTER FOR SCREENING FOR OTHER VIRAL DISEASES: Primary | ICD-10-CM

## 2020-11-06 DIAGNOSIS — Z11.59 ENCOUNTER FOR SCREENING FOR OTHER VIRAL DISEASES: ICD-10-CM

## 2020-11-06 PROCEDURE — U0003 INFECTIOUS AGENT DETECTION BY NUCLEIC ACID (DNA OR RNA); SEVERE ACUTE RESPIRATORY SYNDROME CORONAVIRUS 2 (SARS-COV-2) (CORONAVIRUS DISEASE [COVID-19]), AMPLIFIED PROBE TECHNIQUE, MAKING USE OF HIGH THROUGHPUT TECHNOLOGIES AS DESCRIBED BY CMS-2020-01-R: HCPCS | Performed by: FAMILY MEDICINE

## 2020-11-07 LAB
SARS-COV-2 RNA SPEC QL NAA+PROBE: NOT DETECTED
SPECIMEN SOURCE: NORMAL

## 2020-11-08 ENCOUNTER — ANESTHESIA EVENT (OUTPATIENT)
Dept: GASTROENTEROLOGY | Facility: CLINIC | Age: 50
End: 2020-11-08
Payer: COMMERCIAL

## 2020-11-08 SDOH — HEALTH STABILITY: MENTAL HEALTH: CURRENT SMOKER: 0

## 2020-11-08 ASSESSMENT — ENCOUNTER SYMPTOMS: DYSRHYTHMIAS: 1

## 2020-11-08 ASSESSMENT — LIFESTYLE VARIABLES: TOBACCO_USE: 1

## 2020-11-09 ENCOUNTER — ANESTHESIA (OUTPATIENT)
Dept: GASTROENTEROLOGY | Facility: CLINIC | Age: 50
End: 2020-11-09
Payer: COMMERCIAL

## 2020-11-09 ENCOUNTER — HOSPITAL ENCOUNTER (OUTPATIENT)
Facility: CLINIC | Age: 50
Discharge: HOME | End: 2020-11-09
Attending: FAMILY MEDICINE | Admitting: FAMILY MEDICINE
Payer: COMMERCIAL

## 2020-11-09 VITALS
HEART RATE: 61 BPM | TEMPERATURE: 98.3 F | RESPIRATION RATE: 16 BRPM | SYSTOLIC BLOOD PRESSURE: 114 MMHG | DIASTOLIC BLOOD PRESSURE: 79 MMHG | OXYGEN SATURATION: 99 %

## 2020-11-09 LAB
COLONOSCOPY: NORMAL
HCG UR QL: NEGATIVE

## 2020-11-09 PROCEDURE — 81025 URINE PREGNANCY TEST: CPT | Performed by: NURSE ANESTHETIST, CERTIFIED REGISTERED

## 2020-11-09 PROCEDURE — 250N000009 HC RX 250: Performed by: NURSE ANESTHETIST, CERTIFIED REGISTERED

## 2020-11-09 PROCEDURE — 258N000003 HC RX IP 258 OP 636: Performed by: NURSE ANESTHETIST, CERTIFIED REGISTERED

## 2020-11-09 PROCEDURE — 88305 TISSUE EXAM BY PATHOLOGIST: CPT | Mod: TC | Performed by: FAMILY MEDICINE

## 2020-11-09 PROCEDURE — 88305 TISSUE EXAM BY PATHOLOGIST: CPT | Mod: 26 | Performed by: PATHOLOGY

## 2020-11-09 PROCEDURE — 250N000011 HC RX IP 250 OP 636: Performed by: NURSE ANESTHETIST, CERTIFIED REGISTERED

## 2020-11-09 PROCEDURE — 370N000001 HC ANESTHESIA TECHNICAL FEE, 1ST 30 MIN: Performed by: FAMILY MEDICINE

## 2020-11-09 PROCEDURE — 45380 COLONOSCOPY AND BIOPSY: CPT | Performed by: FAMILY MEDICINE

## 2020-11-09 PROCEDURE — 370N000002 HC ANESTHESIA TECHNICAL FEE, EACH ADDTL 15 MIN: Performed by: FAMILY MEDICINE

## 2020-11-09 RX ORDER — PROCHLORPERAZINE MALEATE 5 MG
10 TABLET ORAL EVERY 6 HOURS PRN
Status: DISCONTINUED | OUTPATIENT
Start: 2020-11-09 | End: 2020-11-09 | Stop reason: HOSPADM

## 2020-11-09 RX ORDER — NALOXONE HYDROCHLORIDE 0.4 MG/ML
.1-.4 INJECTION, SOLUTION INTRAMUSCULAR; INTRAVENOUS; SUBCUTANEOUS
Status: DISCONTINUED | OUTPATIENT
Start: 2020-11-09 | End: 2020-11-09 | Stop reason: HOSPADM

## 2020-11-09 RX ORDER — SODIUM CHLORIDE, SODIUM LACTATE, POTASSIUM CHLORIDE, CALCIUM CHLORIDE 600; 310; 30; 20 MG/100ML; MG/100ML; MG/100ML; MG/100ML
INJECTION, SOLUTION INTRAVENOUS CONTINUOUS
Status: DISCONTINUED | OUTPATIENT
Start: 2020-11-09 | End: 2020-11-09 | Stop reason: HOSPADM

## 2020-11-09 RX ORDER — LIDOCAINE HYDROCHLORIDE 20 MG/ML
INJECTION, SOLUTION INFILTRATION; PERINEURAL PRN
Status: DISCONTINUED | OUTPATIENT
Start: 2020-11-09 | End: 2020-11-09

## 2020-11-09 RX ORDER — ONDANSETRON 4 MG/1
4 TABLET, ORALLY DISINTEGRATING ORAL EVERY 6 HOURS PRN
Status: DISCONTINUED | OUTPATIENT
Start: 2020-11-09 | End: 2020-11-09 | Stop reason: HOSPADM

## 2020-11-09 RX ORDER — ONDANSETRON 2 MG/ML
4 INJECTION INTRAMUSCULAR; INTRAVENOUS
Status: DISCONTINUED | OUTPATIENT
Start: 2020-11-09 | End: 2020-11-09 | Stop reason: HOSPADM

## 2020-11-09 RX ORDER — PROPOFOL 10 MG/ML
INJECTION, EMULSION INTRAVENOUS PRN
Status: DISCONTINUED | OUTPATIENT
Start: 2020-11-09 | End: 2020-11-09

## 2020-11-09 RX ORDER — PROPOFOL 10 MG/ML
INJECTION, EMULSION INTRAVENOUS CONTINUOUS PRN
Status: DISCONTINUED | OUTPATIENT
Start: 2020-11-09 | End: 2020-11-09

## 2020-11-09 RX ORDER — LIDOCAINE 40 MG/G
CREAM TOPICAL
Status: DISCONTINUED | OUTPATIENT
Start: 2020-11-09 | End: 2020-11-09 | Stop reason: HOSPADM

## 2020-11-09 RX ORDER — ONDANSETRON 2 MG/ML
4 INJECTION INTRAMUSCULAR; INTRAVENOUS EVERY 6 HOURS PRN
Status: DISCONTINUED | OUTPATIENT
Start: 2020-11-09 | End: 2020-11-09 | Stop reason: HOSPADM

## 2020-11-09 RX ORDER — FLUMAZENIL 0.1 MG/ML
0.2 INJECTION, SOLUTION INTRAVENOUS
Status: DISCONTINUED | OUTPATIENT
Start: 2020-11-09 | End: 2020-11-09 | Stop reason: HOSPADM

## 2020-11-09 RX ADMIN — PROPOFOL 175 MCG/KG/MIN: 10 INJECTION, EMULSION INTRAVENOUS at 08:40

## 2020-11-09 RX ADMIN — LIDOCAINE HYDROCHLORIDE 5 ML: 10 INJECTION, SOLUTION EPIDURAL; INFILTRATION; INTRACAUDAL; PERINEURAL at 07:23

## 2020-11-09 RX ADMIN — PROPOFOL 50 MG: 10 INJECTION, EMULSION INTRAVENOUS at 08:40

## 2020-11-09 RX ADMIN — LIDOCAINE HYDROCHLORIDE 40 MG: 20 INJECTION, SOLUTION INFILTRATION; PERINEURAL at 08:40

## 2020-11-09 RX ADMIN — SODIUM CHLORIDE, POTASSIUM CHLORIDE, SODIUM LACTATE AND CALCIUM CHLORIDE: 600; 310; 30; 20 INJECTION, SOLUTION INTRAVENOUS at 07:23

## 2020-11-09 NOTE — ANESTHESIA PREPROCEDURE EVALUATION
Anesthesia Pre-Procedure Evaluation    Patient: Tri Mejia   MRN: [id number] : 1970          Preoperative Diagnosis: Special screening for malignant neoplasms, colon [Z12.11]    Procedure(s):  COLONOSCOPY    Past Medical History:   Diagnosis Date     Allergic rhinitis, cause unspecified     Seasonal     Depressive disorder, not elsewhere classified      PVC (premature ventricular contraction)      RECTAL & ANAL HEMORRHAGE 5/10/2007     Unspecified asthma(493.90)      Past Surgical History:   Procedure Laterality Date     COLONOSCOPY  2007    Sm internal hemorrhoids, otherwise normal. Repeat age 50.     ESOPHAGOSCOPY, GASTROSCOPY, DUODENOSCOPY (EGD), COMBINED  10/3/2011    Procedure:COMBINED ESOPHAGOSCOPY, GASTROSCOPY, DUODENOSCOPY (EGD), BIOPSY SINGLE OR MULTIPLE; esophagogastroduodenoscopy with biopsy and dilatation, MULTIPLE BX; Surgeon:CHARY MORAN; Location:[facility]     ESOPHAGOSCOPY, GASTROSCOPY, DUODENOSCOPY (EGD), DILATATION, COMBINED  10/3/2011    Procedure:COMBINED ESOPHAGOSCOPY, GASTROSCOPY, DUODENOSCOPY (EGD), DILATATION; Surgeon:CHARY MORAN; Location:[facility]     HC HYSTEROSCOPY W ENDOMETRIAL BX/POLYPECTOMY W/WO D&C  2007    Hysteroscopy, D&C     HC REMOVAL OF TONSILS,<11 Y/O  1976    Tonsils <12y.o.       Anesthesia Evaluation     . Pt has had prior anesthetic. Type: MAC and General    No history of anesthetic complications          ROS/MED HX    ENT/Pulmonary:     (+)allergic rhinitis, tobacco use, Past use Intermittent asthma Treatment: Inhaler prn,  , . .    Neurologic:  - neg neurologic ROS     Cardiovascular:     (+) ----. : . . . :. dysrhythmias PVCs, . Previous cardiac testing date:results:date: results:ECG reviewed date: results:SB-51 date: results:          METS/Exercise Tolerance:     Hematologic:  - neg hematologic  ROS       Musculoskeletal:  - neg musculoskeletal ROS       GI/Hepatic:     (+) GERD Asymptomatic on medication, bowel prep,      "  Renal/Genitourinary:  - ROS Renal section negative       Endo:         Psychiatric:     (+) psychiatric history anxiety      Infectious Disease:  - neg infectious disease ROS       Malignancy:      - no malignancy   Other:    (+) No chance of pregnancy C-spine cleared: N/A, no H/O Chronic Pain,  - neg other ROS                      Physical Exam  Normal systems: cardiovascular, pulmonary and dental    Airway   Mallampati: II  TM distance: <3 FB  Neck ROM: full    Dental     Cardiovascular   Rhythm and rate: regular and normal      Pulmonary    breath sounds clear to auscultation            Lab Results   Component Value Date    WBC 8.0 10/07/2010    HGB 14.1 04/29/2011    HCT 41.1 10/07/2010     10/07/2010    SED 8 08/15/2011     01/08/2020    POTASSIUM 4.3 01/08/2020    CHLORIDE 110 (H) 01/08/2020    CO2 27 01/08/2020    BUN 13 01/08/2020    CR 0.69 01/08/2020    GLC 96 01/08/2020    AME 9.0 01/08/2020    MAG 2.1 05/31/2006    ALT 8 09/21/2003    AST 27 09/21/2003    TSH 1.61 08/15/2011    HCG Negative 08/15/2011       Preop Vitals  BP Readings from Last 3 Encounters:   01/08/20 126/80   01/02/20 119/79   09/08/19 137/80    Pulse Readings from Last 3 Encounters:   01/08/20 59   01/02/20 59   09/08/19 61      Resp Readings from Last 3 Encounters:   01/08/20 16   07/01/19 16   05/24/19 16    SpO2 Readings from Last 3 Encounters:   01/02/20 98%   09/08/19 97%   07/01/19 95%      Temp Readings from Last 1 Encounters:   01/08/20 97.7  F (36.5  C) (Temporal)    Ht Readings from Last 1 Encounters:   01/08/20 1.571 m (5' 1.85\")      Wt Readings from Last 1 Encounters:   01/08/20 65.5 kg (144 lb 4.8 oz)    Estimated body mass index is 26.52 kg/m  as calculated from the following:    Height as of 1/8/20: 1.571 m (5' 1.85\").    Weight as of 1/8/20: 65.5 kg (144 lb 4.8 oz).       Anesthesia Plan      History & Physical Review  History and physical reviewed and following examination; no interval change.Dr Wilkins " to complete H&P    ASA Status:  2 .        Plan for MAC with Intravenous and Propofol induction. Maintenance will be TIVA.  Reason for MAC:  Deep or markedly invasive procedure (G8)      The patient is not a current smoker      Postoperative Care      Consents  Anesthetic plan, risks, benefits and alternatives discussed with:  Patient.  Use of blood products discussed: No .   .                 ROMELIA Wang CRNA

## 2020-11-09 NOTE — ANESTHESIA POSTPROCEDURE EVALUATION
Patient: Tri Mejia    Procedure(s):  COLONOSCOPY WITH POLYPECTOMY    Diagnosis:Special screening for malignant neoplasms, colon [Z12.11]  Diagnosis Additional Information: No value filed.    Anesthesia Type:  MAC    Note:  Anesthesia Post Evaluation    Patient location during evaluation: Phase 2  Patient participation: Able to fully participate in evaluation  Level of consciousness: awake  Pain management: adequate  Airway patency: patent  Cardiovascular status: blood pressure returned to baseline  Hydration status: acceptable  PONV: none     Anesthetic complications: None    Comments: Patient was very pleased with her anesthesia today. No concerns.         Last vitals:  Vitals:    11/09/20 0709 11/09/20 0910 11/09/20 0912   BP: 126/77 (!) 110/91 107/86   Pulse:  63 74   Resp: 16 16 16   Temp: 98.3  F (36.8  C)     SpO2: 97% 97% 96%         Electronically Signed By: ROMELIA Vigil CRNA  November 9, 2020  9:31 AM

## 2020-11-09 NOTE — ANESTHESIA CARE TRANSFER NOTE
Patient: Tri Mejia    Procedure(s):  COLONOSCOPY WITH POLYPECTOMY    Diagnosis: Special screening for malignant neoplasms, colon [Z12.11]  Diagnosis Additional Information: No value filed.    Anesthesia Type:   MAC     Note:    Patient transferred to:Phase II  Handoff Report: Identifed the Patient, Identified the Reponsible Provider, Reviewed the pertinent medical history, Discussed the surgical course, Reviewed Intra-OP anesthesia mangement and issues during anesthesia, Set expectations for post-procedure period and Allowed opportunity for questions and acknowledgement of understanding      Vitals: (Last set prior to Anesthesia Care Transfer)    CRNA VITALS  11/9/2020 0838 - 11/9/2020 0921      11/9/2020             SpO2:  97 %    Resp Rate (observed):  24                Electronically Signed By: ROMELIA Vigil CRNA  November 9, 2020  9:21 AM

## 2020-11-09 NOTE — DISCHARGE INSTRUCTIONS
[facility]    Home Care Following Endoscopy          Activity:    You have just undergone an endoscopic procedure usually performed with conscious sedation.  Do not work or operate machinery (including a car) for at least 12 hours.      I encourage you to walk and attempt to pass this air as soon as possible.    Diet:    Return to the diet you were on before your procedure but eat lightly for the first 12-24 hours.    Drink plenty of water.    Resume any regular medications unless otherwise advised by your physician.  Please begin any new medication prescribed as a result of your procedure as directed by your physician.     If you had any biopsy or polyp removed please refrain from aspirin or aspirin products for 2 days.  If on Coumadin please restart as instructed by your physician.   Pain:    You may take Tylenol as needed for pain.  Expected Recovery:    You can expect some mild abdominal fullness and/or discomfort due to the air used to inflate your intestinal tract.    Call Your Physician if You Have:    After Colonoscopy:  o Worsening persisting abdominal pain which is worse with activity.  o Fevers (>101 degrees F), chills or shakes.  o Passage of continued blood with bowel movements.   Any questions or concerns about your recovery, please call [phone] or after hours [phone] Nurse Advice Line.    Follow-up Care:  You had one polyp removed.    You should receive a call or letter from Dr. Wilkins with your results within 7-10 days. Please call if you have not received a notification of your results.  If asked to return to clinic please make an appointment 1 week after your procedure.  Call [phone].

## 2020-11-09 NOTE — LETTER
"November 11, 2020      Tri Mejia  [address] [address] [place] [id number]        Dear ,    We are writing to inform you of your test results.    Here are your recent biopsy findings. I am including the pathology report for your review. You had non cancerous tubular adenoma polyp(s). Tubular adenomas, according to latest estimates, have a 10% chance of becoming cancerous if left alone for 10 yrs. If you only had one or two, current screening guidelines say you can go 5-10 yrs before your next colonscopy. Please have a discussion with your primary MD about what is best for you. This should take into account your family and personal medical history.  This of course changes and should be done sooner if you develop symptoms such as blood in your stool, unexplained weight loss, or a family member develops colon cancer.     Please do not hesitate to call my office if you have any questions or concerns. Your primary doctor will be able to arrange any future testing that needs to be done, of feel free to contact my office with any questions, at any time. It was a pleasure to meet you,       Resulted Orders   Surgical pathology exam   Result Value Ref Range    Copath Report       Patient Name: TRI MEJIA  MR#: [id number]  Specimen #: [id number]  Collected: 11/9/2020  Received: 11/10/2020  Reported: 11/11/2020 10:29  Ordering Phy(s): ROSE MARIE BRAGG    For improved result formatting, select 'View Enhanced Report Format' under   Linked Documents section.    SPECIMEN(S):  Colon polyp, descending    FINAL DIAGNOSIS:  Large intestine, descending, polyp, polypectomy:  - Tubular adenoma negative for high-grade dysplasia.    Electronically signed out by:    Rafael Vazquez M.D.    GROSS:  The specimen is received in formalin with the proper patient information,   labeled \"descending colon polyp\".  The specimen consists of a 0.3 cm tan polypoid soft tissue fragment,   entirely submitted in one " cassette.  (Dictated by: Ramo Steiner 11/10/2020 08:06 AM)    MICROSCOPIC:  Sections demonstrate tubular adenoma negative for high-grade dysplasia.    The technical component of this testing was completed at the [facility], with the professional component performed   at the [facility]  [facility]atory, [address] [place] [address]  [phone] ([phone])    CPT Codes:  A: 03762-MY4    COLLECTION SITE:  Client: [facility]  Location: [facility] (P)         If you have any questions or concerns, please call the clinic at the number listed above.       Sincerely,        Melchor Wilkins MD

## 2020-11-09 NOTE — H&P
"Pre-Endoscopy History and Physical     Tri Mejia MRN# [id number]   YOB: 1970 Age: 50 year old     Date of Procedure: 11/9/2020  Primary care provider: Suad Dang  Type of Endoscopy: colonoscopy  Type of Anesthesia Anticipated: MAC     HPI:    Tri is a 50 year old female who will be undergoing screening or surveillance colonoscopy.    Tri is feeling well today. Can get up a single flight of stairs without dyspnea. Estimated METS > 4.      Patient Active Problem List   Diagnosis     Allergic rhinitis     Palpitations     Excessive or frequent menstruation     Hemorrhage of rectum and anus     CARDIOVASCULAR SCREENING; LDL GOAL LESS THAN 160     GERD (gastroesophageal reflux disease)     Intermittent asthma     Pelvic pain in female          REVIEW OF SYSTEMS:     5 point ROS negative except as noted above in HPI, including Gen., Resp., CV, GI &  system review.      PHYSICAL EXAM:   /77   Temp 98.3  F (36.8  C) (Oral)   Resp 16   LMP 10/26/2020   SpO2 97%    Estimated body mass index is 26.52 kg/m  as calculated from the following:    Height as of 1/8/20: 1.571 m (5' 1.85\").    Weight as of 1/8/20: 65.5 kg (144 lb 4.8 oz).    GENERAL APPEARANCE: alert and no distress  RESP: lungs clear and unlabored  CV: regular  ABD: soft, nt/nd    DIAGNOSTICS:    Not indicated  COVID-19 PCR Results    COVID-19 PCR Results 11/6/20   COVID-19 Virus PCR to [facility] - Result Not Detected   COVID-19 Virus PCR to [facility] - Source Nasopharyngeal      Comments are available for some flowsheets but are not being displayed.         COVID-19 Antibody Results, Testing for Immunity    COVID-19 Antibody Results, Testing for Immunity   No data to display.              IMPRESSION   ASA Class 1 - Healthy patient, no medical problems        PLAN:     Plan for colonoscopy. No medical contraindications to proceed, or further work up needed. The risks and benefits of the procedure and the sedation " options and risks were discussed with the patient. These include infection, bleeding, and small risk of colon perforation (1/1000 to 1/28294 depending on patient characteristics and type of procedure). Tri was also explained to alternatives for colo-rectal screening. All questions were answered and informed consent was obtained.      The above has been forwarded to the consulting provider.      Signed Electronically by: Melchor Wilkins MD  November 9, 2020

## 2020-11-11 LAB — COPATH REPORT: NORMAL

## 2021-01-14 NOTE — PATIENT INSTRUCTIONS
Try famotidine (Pepcid) instead of omeprazole for acid reflux.  Add in calcium supplement once daily for bone health.      Preventive Health Recommendations  Female Ages 50 - 64    Yearly exam: See your health care provider every year in order to  o Review health changes.   o Discuss preventive care.    o Review your medicines if your doctor has prescribed any.      Get a Pap test every three years (unless you have an abnormal result and your provider advises testing more often).    If you get Pap tests with HPV test, you only need to test every 5 years, unless you have an abnormal result.     You do not need a Pap test if your uterus was removed (hysterectomy) and you have not had cancer.    You should be tested each year for STDs (sexually transmitted diseases) if you're at risk.     Have a mammogram every 1 to 2 years.    Have a colonoscopy at age 50, or have a yearly FIT test (stool test). These exams screen for colon cancer.      Have a cholesterol test every 5 years, or more often if advised.    Have a diabetes test (fasting glucose) every three years. If you are at risk for diabetes, you should have this test more often.     If you are at risk for osteoporosis (brittle bone disease), think about having a bone density scan (DEXA).    Shots: Get a flu shot each year. Get a tetanus shot every 10 years.    Nutrition:     Eat at least 5 servings of fruits and vegetables each day.    Eat whole-grain bread, whole-wheat pasta and brown rice instead of white grains and rice.    Get adequate Calcium and Vitamin D.     Lifestyle    Exercise at least 150 minutes a week (30 minutes a day, 5 days a week). This will help you control your weight and prevent disease.    Limit alcohol to one drink per day.    No smoking.     Wear sunscreen to prevent skin cancer.     See your dentist every six months for an exam and cleaning.    See your eye doctor every 1 to 2 years.

## 2021-01-14 NOTE — PROGRESS NOTES
SUBJECTIVE:   CC: Tri Mejia is an 50 year old woman who presents for preventive health visit.     Patient has been advised of split billing requirements and indicates understanding: Yes  Healthy Habits:     Getting at least 3 servings of Calcium per day:  Yes    Bi-annual eye exam:  Yes    Dental care twice a year:  Yes    Sleep apnea or symptoms of sleep apnea:  None    Diet:  Regular (no restrictions)    Frequency of exercise:  6-7 days/week    Duration of exercise:  30-45 minutes    Taking medications regularly:  Yes    Medication side effects:  None    PHQ-2 Total Score: 0    Additional concerns today:  No    Right hamstring still feels sore/stiff when she lifts her leg up when seated. Has not worsened. Exercises daily. Has done some stretching of the muscle.      ACT Total Scores 5/21/2019 1/8/2020 1/18/2021   ACT TOTAL SCORE - - -   ASTHMA ER VISITS - - -   ASTHMA HOSPITALIZATIONS - - -   ACT TOTAL SCORE (Goal Greater than or Equal to 20) 22 21 22   In the past 12 months, how many times did you visit the emergency room for your asthma without being admitted to the hospital? 0 0 0   In the past 12 months, how many times were you hospitalized overnight because of your asthma? 0 0 0         Today's PHQ-2 Score:   PHQ-2 ( 1999 Pfizer) 1/18/2021   Q1: Little interest or pleasure in doing things 0   Q2: Feeling down, depressed or hopeless 0   PHQ-2 Score 0   Q1: Little interest or pleasure in doing things Not at all   Q2: Feeling down, depressed or hopeless Not at all   PHQ-2 Score 0       Abuse: Current or Past (Physical, Sexual or Emotional) - No  Do you feel safe in your environment? Yes      Social History     Tobacco Use     Smoking status: Former Smoker     Quit date: 4/26/2005     Years since quitting: 15.7     Smokeless tobacco: Never Used   Substance Use Topics     Alcohol use: Yes     Comment:  a couple of beers weeklly         Alcohol Use 1/18/2021   Prescreen: >3 drinks/day or >7  drinks/week? No   Prescreen: >3 drinks/day or >7 drinks/week? -       Reviewed orders with patient.  Reviewed health maintenance and updated orders accordingly - Yes  Lab work is in process  Labs reviewed in EPIC  BP Readings from Last 3 Encounters:   01/18/21 126/82   11/09/20 114/79   01/08/20 126/80    Wt Readings from Last 3 Encounters:   01/18/21 68.5 kg (151 lb)   01/08/20 65.5 kg (144 lb 4.8 oz)   07/01/19 64.9 kg (143 lb)                  Patient Active Problem List   Diagnosis     Allergic rhinitis     Palpitations     Excessive or frequent menstruation     Hemorrhage of rectum and anus     CARDIOVASCULAR SCREENING; LDL GOAL LESS THAN 160     GERD (gastroesophageal reflux disease)     Intermittent asthma     Pelvic pain in female     Past Surgical History:   Procedure Laterality Date     COLONOSCOPY  05/21/2007    Sm internal hemorrhoids, otherwise normal. Repeat age 50.     COLONOSCOPY N/A 11/9/2020    Procedure: COLONOSCOPY WITH POLYPECTOMY;  Surgeon: Melchor Wilkins MD;  Location: [facility]     ESOPHAGOSCOPY, GASTROSCOPY, DUODENOSCOPY (EGD), COMBINED  10/3/2011    Procedure:COMBINED ESOPHAGOSCOPY, GASTROSCOPY, DUODENOSCOPY (EGD), BIOPSY SINGLE OR MULTIPLE; esophagogastroduodenoscopy with biopsy and dilatation, MULTIPLE BX; Surgeon:CHARY MORAN; Location:[facility]     ESOPHAGOSCOPY, GASTROSCOPY, DUODENOSCOPY (EGD), DILATATION, COMBINED  10/3/2011    Procedure:COMBINED ESOPHAGOSCOPY, GASTROSCOPY, DUODENOSCOPY (EGD), DILATATION; Surgeon:CHARY MORAN; Location:[facility]     HC HYSTEROSCOPY W ENDOMETRIAL BX/POLYPECTOMY W/WO D&C  1/19/2007    Hysteroscopy, D&C     HC REMOVAL OF TONSILS,<11 Y/O  1976    Tonsils <12y.o.       Social History     Tobacco Use     Smoking status: Former Smoker     Quit date: 4/26/2005     Years since quitting: 15.7     Smokeless tobacco: Never Used   Substance Use Topics     Alcohol use: Yes     Comment:  a couple of beers weeklly     Family History   Problem Relation Age  of Onset     Lipids Mother      Connective Tissue Disorder Mother         osteopenia     Alzheimer Disease Mother      Hypertension Father      Arthritis Father      Lipids Father      Cerebrovascular Disease Paternal Grandmother      Cerebrovascular Disease Paternal Grandfather      Depression Sister      Depression Brother      Lipids Sister      C.A.D. Sister          of MI at 42. obese and developmental delay         Current Outpatient Medications   Medication Sig Dispense Refill     albuterol (VENTOLIN HFA) 108 (90 Base) MCG/ACT inhaler INHALE 2 PUFFS INTO THE LUNGS EVERY 6 HOURS AS NEEDED FOR SHORTNESS OF BREATH OR WHEEZING 3 Inhaler 1     Cetirizine HCl (ZYRTEC PO)        Cholecalciferol (VITAMIN D3 PO) Take by mouth daily       fish oil-omega-3 fatty acids 1000 MG capsule Take 1 g by mouth daily       Multiple Vitamin (MULTI-VITAMIN DAILY PO) Take  by mouth.       Omeprazole (PRILOSEC PO) Take by mouth daily       Allergies   Allergen Reactions     Sulfa Drugs Hives     bactrim     Recent Labs   Lab Test 21  0750 20  0903 10/12/18  0923 17  0807 17  0807   A1C  --   --   --   --  5.4   * 95 155*  --  129*   HDL 64 61 51  --  55   TRIG 82 117 86  --  106   CR 0.62 0.69 0.76   < >  --    GFRESTIMATED >90 >90 82   < >  --    GFRESTBLACK >90 >90 >90   < >  --    POTASSIUM 3.8 4.3 3.8   < >  --    TSH 1.55  --   --   --   --     < > = values in this interval not displayed.        Mammogram Screening: Patient over age 50, mutual decision to screen reflected in health maintenance.    Pertinent mammograms are reviewed under the imaging tab.  History of abnormal Pap smear:   NO - age 30-65 PAP every 5 years with negative HPV co-testing recommended  Last 3 Pap and HPV Results:   PAP / HPV Latest Ref Rng & Units 10/9/2018 2015 3/20/2012   PAP - NIL NIL NIL   HPV 16 DNA NEG:Negative Negative Negative -   HPV 18 DNA NEG:Negative Negative Negative -   OTHER HR HPV NEG:Negative  Negative Negative -     PAP / HPV Latest Ref Rng & Units 10/9/2018 4/29/2015 3/20/2012   PAP - NIL NIL NIL   HPV 16 DNA NEG:Negative Negative Negative -   HPV 18 DNA NEG:Negative Negative Negative -   OTHER HR HPV NEG:Negative Negative Negative -     Reviewed and updated as needed this visit by clinical staff  Tobacco  Allergies  Meds   Med Hx  Surg Hx  Fam Hx  Soc Hx        Reviewed and updated as needed this visit by Provider                Past Medical History:   Diagnosis Date     Allergic rhinitis, cause unspecified     Seasonal     Depressive disorder, not elsewhere classified      PVC (premature ventricular contraction)      RECTAL & ANAL HEMORRHAGE 5/10/2007     Unspecified asthma(493.90) 1995      Past Surgical History:   Procedure Laterality Date     COLONOSCOPY  05/21/2007    Sm internal hemorrhoids, otherwise normal. Repeat age 50.     COLONOSCOPY N/A 11/9/2020    Procedure: COLONOSCOPY WITH POLYPECTOMY;  Surgeon: Melchor Wilkins MD;  Location: [facility]     ESOPHAGOSCOPY, GASTROSCOPY, DUODENOSCOPY (EGD), COMBINED  10/3/2011    Procedure:COMBINED ESOPHAGOSCOPY, GASTROSCOPY, DUODENOSCOPY (EGD), BIOPSY SINGLE OR MULTIPLE; esophagogastroduodenoscopy with biopsy and dilatation, MULTIPLE BX; Surgeon:CHARY MORAN; Location:[facility]     ESOPHAGOSCOPY, GASTROSCOPY, DUODENOSCOPY (EGD), DILATATION, COMBINED  10/3/2011    Procedure:COMBINED ESOPHAGOSCOPY, GASTROSCOPY, DUODENOSCOPY (EGD), DILATATION; Surgeon:CHARY MORAN; Location: [facility]     HC HYSTEROSCOPY W ENDOMETRIAL BX/POLYPECTOMY W/WO D&C  1/19/2007    Hysteroscopy, D&C     HC REMOVAL OF TONSILS,<11 Y/O  1976    Tonsils <12y.o.       Review of Systems   Constitutional: Negative for chills and fever.   HENT: Positive for congestion. Negative for ear pain, hearing loss and sore throat.    Eyes: Negative for pain and visual disturbance.   Respiratory: Negative for cough and shortness of breath.    Cardiovascular: Negative for chest pain,  "palpitations and peripheral edema.   Gastrointestinal: Negative for abdominal pain, constipation, diarrhea, heartburn, hematochezia and nausea.   Breasts:  Negative for tenderness, breast mass and discharge.   Genitourinary: Negative for dysuria, frequency, genital sores, hematuria, pelvic pain, urgency, vaginal bleeding and vaginal discharge.   Musculoskeletal: Positive for myalgias. Negative for arthralgias and joint swelling.   Skin: Negative for rash.   Neurological: Negative for dizziness, weakness, headaches and paresthesias.   Psychiatric/Behavioral: Negative for mood changes. The patient is not nervous/anxious.         OBJECTIVE:   /82   Pulse 67   Temp 97.8  F (36.6  C) (Temporal)   Ht 1.575 m (5' 2.01\")   Wt 68.5 kg (151 lb)   LMP 11/15/2020 (Approximate)   SpO2 99%   BMI 27.61 kg/m    Physical Exam  GENERAL: healthy, alert and no distress  EYES: Eyes grossly normal to inspection, PERRL and conjunctivae and sclerae normal  HENT: ear canals and TM's normal, nose and mouth without ulcers or lesions  NECK: no adenopathy, no asymmetry, masses, or scars and thyroid normal to palpation  RESP: lungs clear to auscultation - no rales, rhonchi or wheezes  BREAST: normal without masses, tenderness or nipple discharge and no palpable axillary masses or adenopathy  CV: regular rate and rhythm, normal S1 S2, no S3 or S4, no murmur, click or rub, no peripheral edema and peripheral pulses strong  ABDOMEN: soft, nontender, no hepatosplenomegaly, no masses and bowel sounds normal  MS: no gross musculoskeletal defects noted, no edema  SKIN: no suspicious lesions or rashes  NEURO: Normal strength and tone, mentation intact and speech normal  PSYCH: mentation appears normal, affect normal/bright    Diagnostic Test Results:  Labs reviewed in Epic  Results for orders placed or performed in visit on 01/18/21 (from the past 24 hour(s))   Lipid panel reflex to direct LDL Fasting   Result Value Ref Range    Cholesterol " 228 (H) <200 mg/dL    Triglycerides 82 <150 mg/dL    HDL Cholesterol 64 >49 mg/dL    LDL Cholesterol Calculated 148 (H) <100 mg/dL    Non HDL Cholesterol 164 (H) <130 mg/dL   TSH with free T4 reflex   Result Value Ref Range    TSH 1.55 0.40 - 4.00 mU/L   Basic metabolic panel  (Ca, Cl, CO2, Creat, Gluc, K, Na, BUN)   Result Value Ref Range    Sodium 139 133 - 144 mmol/L    Potassium 3.8 3.4 - 5.3 mmol/L    Chloride 106 94 - 109 mmol/L    Carbon Dioxide 30 20 - 32 mmol/L    Anion Gap 3 3 - 14 mmol/L    Glucose 105 (H) 70 - 99 mg/dL    Urea Nitrogen 16 7 - 30 mg/dL    Creatinine 0.62 0.52 - 1.04 mg/dL    GFR Estimate >90 >60 mL/min/[1.73_m2]    GFR Estimate If Black >90 >60 mL/min/[1.73_m2]    Calcium 9.0 8.5 - 10.1 mg/dL       ASSESSMENT/PLAN:   1. Routine general medical examination at a health care facility  Doing well. Right hamstring is sore only when she lifts her leg when in a seated position. Not worsening over time. Recommended she try targeted stretches and paying attention to how she sits to see if the way she is sitting at work is potentially contributing to her symptoms.     2. Screening for hyperlipidemia  - Lipid panel reflex to direct LDL Fasting    3. Intermittent asthma, uncomplicated  Uses rarely.   - albuterol (VENTOLIN HFA) 108 (90 Base) MCG/ACT inhaler; INHALE 2 PUFFS INTO THE LUNGS EVERY 6 HOURS AS NEEDED FOR SHORTNESS OF BREATH OR WHEEZING  Dispense: 3 Inhaler; Refill: 1    4. Encounter for screening mammogram for breast cancer  - *MA Screening Digital Bilateral; Future    5. Weight gain  Has been gaining weight despite no change in diet or exercise routine. Has not had period for 2 months so may be metabolism changes associated with menopause.   - TSH with free T4 reflex    6. Screening for diabetes mellitus  - Basic metabolic panel  (Ca, Cl, CO2, Creat, Gluc, K, Na, BUN)    Patient has been advised of split billing requirements and indicates understanding: Yes  COUNSELING:  Reviewed  "preventive health counseling, as reflected in patient instructions       Regular exercise       Healthy diet/nutrition       Osteoporosis prevention/bone health       Colon cancer screening       (Darlyn)menopause management    Estimated body mass index is 27.61 kg/m  as calculated from the following:    Height as of this encounter: 1.575 m (5' 2.01\").    Weight as of this encounter: 68.5 kg (151 lb).    Weight management plan: Discussed healthy diet and exercise guidelines    She reports that she quit smoking about 15 years ago. She has never used smokeless tobacco.      Counseling Resources:  ATP IV Guidelines  Pooled Cohorts Equation Calculator  Breast Cancer Risk Calculator  BRCA-Related Cancer Risk Assessment: FHS-7 Tool  FRAX Risk Assessment  ICSI Preventive Guidelines  Dietary Guidelines for Americans, 2010  USDA's MyPlate  ASA Prophylaxis  Lung CA Screening    ROMELIA Aguayo CNP  [facility]  "

## 2021-01-18 ENCOUNTER — OFFICE VISIT (OUTPATIENT)
Dept: FAMILY MEDICINE | Facility: OTHER | Age: 51
End: 2021-01-18
Payer: COMMERCIAL

## 2021-01-18 VITALS
SYSTOLIC BLOOD PRESSURE: 126 MMHG | OXYGEN SATURATION: 99 % | HEART RATE: 67 BPM | DIASTOLIC BLOOD PRESSURE: 82 MMHG | HEIGHT: 62 IN | BODY MASS INDEX: 27.79 KG/M2 | TEMPERATURE: 97.8 F | WEIGHT: 151 LBS

## 2021-01-18 DIAGNOSIS — Z13.1 SCREENING FOR DIABETES MELLITUS: ICD-10-CM

## 2021-01-18 DIAGNOSIS — Z00.00 ROUTINE GENERAL MEDICAL EXAMINATION AT A HEALTH CARE FACILITY: Primary | ICD-10-CM

## 2021-01-18 DIAGNOSIS — R63.5 WEIGHT GAIN: ICD-10-CM

## 2021-01-18 DIAGNOSIS — Z12.31 ENCOUNTER FOR SCREENING MAMMOGRAM FOR BREAST CANCER: ICD-10-CM

## 2021-01-18 DIAGNOSIS — J45.20 INTERMITTENT ASTHMA, UNCOMPLICATED: ICD-10-CM

## 2021-01-18 DIAGNOSIS — Z13.220 SCREENING FOR HYPERLIPIDEMIA: ICD-10-CM

## 2021-01-18 LAB
ANION GAP SERPL CALCULATED.3IONS-SCNC: 3 MMOL/L (ref 3–14)
BUN SERPL-MCNC: 16 MG/DL (ref 7–30)
CALCIUM SERPL-MCNC: 9 MG/DL (ref 8.5–10.1)
CHLORIDE SERPL-SCNC: 106 MMOL/L (ref 94–109)
CHOLEST SERPL-MCNC: 228 MG/DL
CO2 SERPL-SCNC: 30 MMOL/L (ref 20–32)
CREAT SERPL-MCNC: 0.62 MG/DL (ref 0.52–1.04)
GFR SERPL CREATININE-BSD FRML MDRD: >90 ML/MIN/{1.73_M2}
GLUCOSE SERPL-MCNC: 105 MG/DL (ref 70–99)
HDLC SERPL-MCNC: 64 MG/DL
LDLC SERPL CALC-MCNC: 148 MG/DL
NONHDLC SERPL-MCNC: 164 MG/DL
POTASSIUM SERPL-SCNC: 3.8 MMOL/L (ref 3.4–5.3)
SODIUM SERPL-SCNC: 139 MMOL/L (ref 133–144)
TRIGL SERPL-MCNC: 82 MG/DL
TSH SERPL DL<=0.005 MIU/L-ACNC: 1.55 MU/L (ref 0.4–4)

## 2021-01-18 PROCEDURE — 80048 BASIC METABOLIC PNL TOTAL CA: CPT | Performed by: STUDENT IN AN ORGANIZED HEALTH CARE EDUCATION/TRAINING PROGRAM

## 2021-01-18 PROCEDURE — 80061 LIPID PANEL: CPT | Performed by: STUDENT IN AN ORGANIZED HEALTH CARE EDUCATION/TRAINING PROGRAM

## 2021-01-18 PROCEDURE — 99396 PREV VISIT EST AGE 40-64: CPT | Performed by: STUDENT IN AN ORGANIZED HEALTH CARE EDUCATION/TRAINING PROGRAM

## 2021-01-18 PROCEDURE — 84443 ASSAY THYROID STIM HORMONE: CPT | Performed by: STUDENT IN AN ORGANIZED HEALTH CARE EDUCATION/TRAINING PROGRAM

## 2021-01-18 PROCEDURE — 36415 COLL VENOUS BLD VENIPUNCTURE: CPT | Performed by: STUDENT IN AN ORGANIZED HEALTH CARE EDUCATION/TRAINING PROGRAM

## 2021-01-18 RX ORDER — ALBUTEROL SULFATE 90 UG/1
AEROSOL, METERED RESPIRATORY (INHALATION)
Qty: 3 INHALER | Refills: 1 | Status: SHIPPED | OUTPATIENT
Start: 2021-01-18 | End: 2022-05-17

## 2021-01-18 ASSESSMENT — MIFFLIN-ST. JEOR: SCORE: 1258.31

## 2021-01-18 ASSESSMENT — ENCOUNTER SYMPTOMS
HEMATOCHEZIA: 0
MYALGIAS: 1
CONSTIPATION: 0
CHILLS: 0
JOINT SWELLING: 0
EYE PAIN: 0
HEARTBURN: 0
SHORTNESS OF BREATH: 0
NERVOUS/ANXIOUS: 0
BREAST MASS: 0
HEMATURIA: 0
COUGH: 0
FREQUENCY: 0
DYSURIA: 0
DIARRHEA: 0
ARTHRALGIAS: 0
NAUSEA: 0
HEADACHES: 0
ABDOMINAL PAIN: 0
PALPITATIONS: 0
FEVER: 0
SORE THROAT: 0
DIZZINESS: 0
PARESTHESIAS: 0
WEAKNESS: 0

## 2021-01-19 ASSESSMENT — ASTHMA QUESTIONNAIRES: ACT_TOTALSCORE: 22

## 2021-03-13 ENCOUNTER — HEALTH MAINTENANCE LETTER (OUTPATIENT)
Age: 51
End: 2021-03-13

## 2021-09-09 ENCOUNTER — HOSPITAL ENCOUNTER (OUTPATIENT)
Dept: MAMMOGRAPHY | Facility: CLINIC | Age: 51
Discharge: HOME | End: 2021-09-09
Attending: STUDENT IN AN ORGANIZED HEALTH CARE EDUCATION/TRAINING PROGRAM | Admitting: STUDENT IN AN ORGANIZED HEALTH CARE EDUCATION/TRAINING PROGRAM
Payer: COMMERCIAL

## 2021-09-09 DIAGNOSIS — Z12.31 ENCOUNTER FOR SCREENING MAMMOGRAM FOR BREAST CANCER: ICD-10-CM

## 2021-09-09 PROCEDURE — 77063 BREAST TOMOSYNTHESIS BI: CPT

## 2021-10-19 ENCOUNTER — LAB (OUTPATIENT)
Dept: LAB | Facility: OTHER | Age: 51
End: 2021-10-19
Payer: COMMERCIAL

## 2021-10-19 ENCOUNTER — VIRTUAL VISIT (OUTPATIENT)
Dept: URGENT CARE | Facility: CLINIC | Age: 51
End: 2021-10-19
Payer: COMMERCIAL

## 2021-10-19 DIAGNOSIS — R35.0 URINARY FREQUENCY: Primary | ICD-10-CM

## 2021-10-19 DIAGNOSIS — R35.0 URINARY FREQUENCY: ICD-10-CM

## 2021-10-19 LAB
ALBUMIN UR-MCNC: NEGATIVE MG/DL
APPEARANCE UR: CLEAR
BACTERIA #/AREA URNS HPF: ABNORMAL /HPF
BILIRUB UR QL STRIP: NEGATIVE
CLUE CELLS: ABNORMAL
COLOR UR AUTO: YELLOW
GLUCOSE UR STRIP-MCNC: NEGATIVE MG/DL
HGB UR QL STRIP: ABNORMAL
KETONES UR STRIP-MCNC: NEGATIVE MG/DL
LEUKOCYTE ESTERASE UR QL STRIP: NEGATIVE
MUCOUS THREADS #/AREA URNS LPF: PRESENT /LPF
NITRATE UR QL: NEGATIVE
PH UR STRIP: 7 [PH] (ref 5–7)
RBC #/AREA URNS AUTO: ABNORMAL /HPF
SP GR UR STRIP: 1.01 (ref 1–1.03)
SQUAMOUS #/AREA URNS AUTO: ABNORMAL /LPF
TRICHOMONAS, WET PREP: ABNORMAL
UROBILINOGEN UR STRIP-ACNC: 0.2 E.U./DL
WBC #/AREA URNS AUTO: ABNORMAL /HPF
WBC'S/HIGH POWER FIELD, WET PREP: ABNORMAL
YEAST, WET PREP: ABNORMAL

## 2021-10-19 PROCEDURE — 87210 SMEAR WET MOUNT SALINE/INK: CPT

## 2021-10-19 PROCEDURE — 81001 URINALYSIS AUTO W/SCOPE: CPT

## 2021-10-19 PROCEDURE — 99213 OFFICE O/P EST LOW 20 MIN: CPT | Mod: GT

## 2021-10-19 NOTE — PATIENT INSTRUCTIONS
You have an appointment today for a lab only visit at [facility] for a urinalysis and a wet prep.  Once these labs are back I will contact you either by phone or MyChart with your results and the next steps.

## 2021-10-19 NOTE — PROGRESS NOTES
Tri is a 51 year old who is being evaluated via a billable telephone visit.      What phone number would you like to be contacted at? [phone]  How would you like to obtain your AVS? Cherelle    Assessment & Plan     Urinary frequency  Was able to get her in on the lab schedule at [facility] today at 115 for urinalysis and wet prep.  We will contact her when these results come back.    - UA reflex to Microscopic and Culture; Future  - Wet prep; Future    Addendum: Lab results reviewed. No evidence of UTI, BV or yeast. Called Tri with results. Recommend she f/u with her PCP this week for further evaluation.     17 minutes spent on the date of the encounter doing chart review, review of test results, patient visit and documentation   Results for orders placed or performed in visit on 10/19/21   UA reflex to Microscopic and Culture     Status: Abnormal    Specimen: Urine, Midstream   Result Value Ref Range    Color Urine Yellow Colorless, Straw, Light Yellow, Yellow    Appearance Urine Clear Clear    Glucose Urine Negative Negative mg/dL    Bilirubin Urine Negative Negative    Ketones Urine Negative Negative mg/dL    Specific Gravity Urine 1.015 1.003 - 1.035    Blood Urine Trace (A) Negative    pH Urine 7.0 5.0 - 7.0    Protein Albumin Urine Negative Negative mg/dL    Urobilinogen Urine 0.2 0.2, 1.0 E.U./dL    Nitrite Urine Negative Negative    Leukocyte Esterase Urine Negative Negative   Urine Microscopic     Status: Abnormal   Result Value Ref Range    Bacteria Urine Few (A) None Seen /HPF    RBC Urine 0-2 0-2 /HPF /HPF    WBC Urine 0-5 0-5 /HPF /HPF    Squamous Epithelials Urine Few (A) None Seen /LPF    Mucus Urine Present (A) None Seen /LPF    Narrative    Urine Culture not indicated   Wet prep     Status: Abnormal    Specimen: Vagina; Swab   Result Value Ref Range    Trichomonas Absent Absent    Yeast Absent Absent    Clue Cells Absent Absent    WBCs/high power field 2+ (A) None  "         BMI:   Estimated body mass index is 27.61 kg/m  as calculated from the following:    Height as of 1/18/21: 1.575 m (5' 2.01\").    Weight as of 1/18/21: 68.5 kg (151 lb).       See Patient Instructions    No follow-ups on file.    Virtual Urgent Care  [facility] URGENT [facility]    Marko Bartlett is a 51 year old who presents for the following health issues     HPI     51-year-old female presents to virtual urgent care via a phone visit for urinary frequency and lower abdominal pain for the past week.  She feels she could have a bladder infection.  No blood in the urine, no fever or chills.  No back pain, no incontinence.  No vaginal discharge or odor.  Did have some vaginal itching earlier in the week and took Monistat suppositories which seem to help.  Does not have frequent UTIs, last one was roughly 18 years ago.    Review of Systems   Constitutional, HEENT, cardiovascular, pulmonary, gi and gu systems are negative, except as otherwise noted.      Objective           Vitals:  No vitals were obtained today due to virtual visit.    Physical Exam   healthy, alert and no distress  PSYCH: Alert and oriented times 3; coherent speech, normal   rate and volume, able to articulate logical thoughts, able   to abstract reason, no tangential thoughts, no hallucinations   or delusions  Her affect is normal  RESP: No cough, no audible wheezing, able to talk in full sentences  Remainder of exam unable to be completed due to telephone visits            Phone call duration: 8 minutes  "

## 2021-10-23 ENCOUNTER — HEALTH MAINTENANCE LETTER (OUTPATIENT)
Age: 51
End: 2021-10-23

## 2022-04-09 ENCOUNTER — HEALTH MAINTENANCE LETTER (OUTPATIENT)
Age: 52
End: 2022-04-09

## 2022-05-17 ENCOUNTER — OFFICE VISIT (OUTPATIENT)
Dept: FAMILY MEDICINE | Facility: OTHER | Age: 52
End: 2022-05-17
Payer: COMMERCIAL

## 2022-05-17 VITALS
TEMPERATURE: 99 F | HEIGHT: 62 IN | BODY MASS INDEX: 26.68 KG/M2 | DIASTOLIC BLOOD PRESSURE: 85 MMHG | HEART RATE: 61 BPM | OXYGEN SATURATION: 100 % | WEIGHT: 145 LBS | SYSTOLIC BLOOD PRESSURE: 125 MMHG

## 2022-05-17 DIAGNOSIS — Z11.59 NEED FOR HEPATITIS C SCREENING TEST: ICD-10-CM

## 2022-05-17 DIAGNOSIS — J45.20 INTERMITTENT ASTHMA, UNCOMPLICATED: ICD-10-CM

## 2022-05-17 DIAGNOSIS — Z00.00 ROUTINE GENERAL MEDICAL EXAMINATION AT A HEALTH CARE FACILITY: Primary | ICD-10-CM

## 2022-05-17 DIAGNOSIS — Z13.220 SCREENING FOR HYPERLIPIDEMIA: ICD-10-CM

## 2022-05-17 DIAGNOSIS — Z13.1 SCREENING FOR DIABETES MELLITUS: ICD-10-CM

## 2022-05-17 DIAGNOSIS — R10.2 PELVIC PAIN IN FEMALE: ICD-10-CM

## 2022-05-17 LAB
CHOLEST SERPL-MCNC: 244 MG/DL
FASTING STATUS PATIENT QL REPORTED: YES
FASTING STATUS PATIENT QL REPORTED: YES
GLUCOSE BLD-MCNC: 114 MG/DL (ref 70–99)
HCV AB SERPL QL IA: NONREACTIVE
HDLC SERPL-MCNC: 67 MG/DL
LDLC SERPL CALC-MCNC: 148 MG/DL
NONHDLC SERPL-MCNC: 177 MG/DL
TRIGL SERPL-MCNC: 145 MG/DL

## 2022-05-17 PROCEDURE — 80061 LIPID PANEL: CPT | Performed by: PHYSICIAN ASSISTANT

## 2022-05-17 PROCEDURE — 36415 COLL VENOUS BLD VENIPUNCTURE: CPT | Performed by: PHYSICIAN ASSISTANT

## 2022-05-17 PROCEDURE — 86803 HEPATITIS C AB TEST: CPT | Performed by: PHYSICIAN ASSISTANT

## 2022-05-17 PROCEDURE — 99396 PREV VISIT EST AGE 40-64: CPT | Performed by: PHYSICIAN ASSISTANT

## 2022-05-17 PROCEDURE — 82947 ASSAY GLUCOSE BLOOD QUANT: CPT | Performed by: PHYSICIAN ASSISTANT

## 2022-05-17 RX ORDER — ALBUTEROL SULFATE 90 UG/1
2 AEROSOL, METERED RESPIRATORY (INHALATION) EVERY 6 HOURS
Qty: 18 G | Refills: 11 | Status: SHIPPED | OUTPATIENT
Start: 2022-05-17 | End: 2023-06-01

## 2022-05-17 ASSESSMENT — ENCOUNTER SYMPTOMS
CHILLS: 0
DIARRHEA: 0
ARTHRALGIAS: 0
HEADACHES: 0
ABDOMINAL PAIN: 0
MYALGIAS: 0
SHORTNESS OF BREATH: 0
PALPITATIONS: 0
WEAKNESS: 0
SORE THROAT: 0
HEARTBURN: 0
EYE PAIN: 0
FREQUENCY: 0
HEMATOCHEZIA: 0
COUGH: 0
DYSURIA: 0
HEMATURIA: 0
DIZZINESS: 0
NERVOUS/ANXIOUS: 0
NAUSEA: 0
PARESTHESIAS: 0
CONSTIPATION: 0
JOINT SWELLING: 0
FEVER: 0
BREAST MASS: 0

## 2022-05-17 ASSESSMENT — ASTHMA QUESTIONNAIRES
QUESTION_2 LAST FOUR WEEKS HOW OFTEN HAVE YOU HAD SHORTNESS OF BREATH: ONCE OR TWICE A WEEK
QUESTION_4 LAST FOUR WEEKS HOW OFTEN HAVE YOU USED YOUR RESCUE INHALER OR NEBULIZER MEDICATION (SUCH AS ALBUTEROL): TWO OR THREE TIMES PER WEEK
QUESTION_5 LAST FOUR WEEKS HOW WOULD YOU RATE YOUR ASTHMA CONTROL: COMPLETELY CONTROLLED
ACT_TOTALSCORE: 22
ACT_TOTALSCORE: 22
QUESTION_3 LAST FOUR WEEKS HOW OFTEN DID YOUR ASTHMA SYMPTOMS (WHEEZING, COUGHING, SHORTNESS OF BREATH, CHEST TIGHTNESS OR PAIN) WAKE YOU UP AT NIGHT OR EARLIER THAN USUAL IN THE MORNING: NOT AT ALL
QUESTION_1 LAST FOUR WEEKS HOW MUCH OF THE TIME DID YOUR ASTHMA KEEP YOU FROM GETTING AS MUCH DONE AT WORK, SCHOOL OR AT HOME: NONE OF THE TIME

## 2022-05-17 ASSESSMENT — PAIN SCALES - GENERAL: PAINLEVEL: NO PAIN (0)

## 2022-05-17 NOTE — RESULT ENCOUNTER NOTE
Gianfranco Bartlett    Your cholesterol is about the same. Glucose a little bit increased, so we will have to keep an eye on this as it puts you into prediabetes range. I recommend recheck of both in 1 year. Hep C was negative.     The results are attached for your review.       Sammy Pierce PA-C

## 2022-05-17 NOTE — PATIENT INSTRUCTIONS
Check with insurance for coverage of the new shingles vaccine, Shingrix (ask them how old do I need to be and where should I get it done pharmacy or clinic). If it is not covered now, it may be covered later in the year. Shingrix is given in a 2 shot series, between 2 and 6 months apart. It is recommended for adults age 50 and older. Initial studies have indicated that this new vaccine is 85-90% effective at preventing shingles, and is preferred over the old Zostavax vaccine.             Preventive Health Recommendations  Female Ages 50 - 64    Yearly exam: See your health care provider every year in order to  Review health changes.   Discuss preventive care.    Review your medicines if your doctor has prescribed any.    Get a Pap test every three years (unless you have an abnormal result and your provider advises testing more often).  If you get Pap tests with HPV test, you only need to test every 5 years, unless you have an abnormal result.   You do not need a Pap test if your uterus was removed (hysterectomy) and you have not had cancer.  You should be tested each year for STDs (sexually transmitted diseases) if you're at risk.   Have a mammogram every 1 to 2 years.  Have a colonoscopy at age 50, or have a yearly FIT test (stool test). These exams screen for colon cancer.    Have a cholesterol test every 5 years, or more often if advised.  Have a diabetes test (fasting glucose) every three years. If you are at risk for diabetes, you should have this test more often.   If you are at risk for osteoporosis (brittle bone disease), think about having a bone density scan (DEXA).    Shots: Get a flu shot each year. Get a tetanus shot every 10 years.    Nutrition:   Eat at least 5 servings of fruits and vegetables each day.  Eat whole-grain bread, whole-wheat pasta and brown rice instead of white grains and rice.  Get adequate Calcium and Vitamin D.     Lifestyle  Exercise at least 150 minutes a week (30 minutes a day,  5 days a week). This will help you control your weight and prevent disease.  Limit alcohol to one drink per day.  No smoking.   Wear sunscreen to prevent skin cancer.   See your dentist every six months for an exam and cleaning.  See your eye doctor every 1 to 2 years.

## 2022-05-17 NOTE — LETTER
My Asthma Action Plan    Name: Tri Mejia   YOB: 1970  Date: 5/17/2022   My doctor: Renetta Pierce PA-C   My clinic: [facility]        My Rescue Medicine:   Albuterol inhaler (Proair/Ventolin/Proventil HFA)  2-4 puffs EVERY 4 HOURS as needed. Use a spacer if recommended by your provider.   My Asthma Severity:   Intermittent / Exercise Induced  Know your asthma triggers:              GREEN ZONE   Good Control    I feel good    No cough or wheeze    Can work, sleep and play without asthma symptoms       Take your asthma control medicine every day.     1. If exercise triggers your asthma, take your rescue medication    15 minutes before exercise or sports, and    During exercise if you have asthma symptoms  2. Spacer to use with inhaler: If you have a spacer, make sure to use it with your inhaler             YELLOW ZONE Getting Worse  I have ANY of these:    I do not feel good    Cough or wheeze    Chest feels tight    Wake up at night   1. Keep taking your Green Zone medications  2. Start taking your rescue medicine:    every 20 minutes for up to 1 hour. Then every 4 hours for 24-48 hours.  3. If you stay in the Yellow Zone for more than 12-24 hours, contact your doctor.  4. If you do not return to the Green Zone in 12-24 hours or you get worse, start taking your oral steroid medicine if prescribed by your provider.           RED ZONE Medical Alert - Get Help  I have ANY of these:    I feel awful    Medicine is not helping    Breathing getting harder    Trouble walking or talking    Nose opens wide to breathe       1. Take your rescue medicine NOW  2. If your provider has prescribed an oral steroid medicine, start taking it NOW  3. Call your doctor NOW  4. If you are still in the Red Zone after 20 minutes and you have not reached your doctor:    Take your rescue medicine again and    Call 911 or go to the emergency room right away    See your regular  doctor within 2 weeks of an Emergency Room or Urgent Care visit for follow-up treatment.          Annual Reminders:  Meet with Asthma Educator,  Flu Shot in the Fall, consider Pneumonia Vaccination for patients with asthma (aged 19 and older).    Pharmacy:    [id number] - [place], [place] - [address] [facility] [place] [facility] [place] - [address] - [place], [place] - [address]    Electronically signed by Renetta Pierce PA-C   Date: 05/17/22                    Asthma Triggers  How To Control Things That Make Your Asthma Worse    Triggers are things that make your asthma worse.  Look at the list below to help you find your triggers and   what you can do about them. You can help prevent asthma flare-ups by staying away from your triggers.      Trigger                                                          What you can do   Cigarette Smoke  Tobacco smoke can make asthma worse. Do not allow smoking in your home, car or around you.  Be sure no one smokes at a child s day care or school.  If you smoke, ask your health care provider for ways to help you quit.  Ask family members to quit too.  Ask your health care provider for a referral to Quit Plan to help you quit smoking, or call 4-953-343-PLAN.     Colds, Flu, Bronchitis  These are common triggers of asthma. Wash your hands often.  Don t touch your eyes, nose or mouth.  Get a flu shot every year.     Dust Mites  These are tiny bugs that live in cloth or carpet. They are too small to see. Wash sheets and blankets in hot water every week.   Encase pillows and mattress in dust mite proof covers.  Avoid having carpet if you can. If you have carpet, vacuum weekly.   Use a dust mask and HEPA vacuum.   Pollen and Outdoor Mold  Some people are allergic to trees, grass, or weed pollen, or molds. Try to keep your windows closed.  Limit time out doors when pollen count is high.   Ask you health care provider about  taking medicine during allergy season.     Animal Dander  Some people are allergic to skin flakes, urine or saliva from pets with fur or feathers. Keep pets with fur or feathers out of your home.    If you can t keep the pet outdoors, then keep the pet out of your bedroom.  Keep the bedroom door closed.  Keep pets off cloth furniture and away from stuffed toys.     Mice, Rats, and Cockroaches  Some people are allergic to the waste from these pests.   Cover food and garbage.  Clean up spills and food crumbs.  Store grease in the refrigerator.   Keep food out of the bedroom.   Indoor Mold  This can be a trigger if your home has high moisture. Fix leaking faucets, pipes, or other sources of water.   Clean moldy surfaces.  Dehumidify basement if it is damp and smelly.   Smoke, Strong Odors, and Sprays  These can reduce air quality. Stay away from strong odors and sprays, such as perfume, powder, hair spray, paints, smoke incense, paint, cleaning products, candles and new carpet.   Exercise or Sports  Some people with asthma have this trigger. Be active!  Ask your doctor about taking medicine before sports or exercise to prevent symptoms.    Warm up for 5-10 minutes before and after sports or exercise.     Other Triggers of Asthma  Cold air:  Cover your nose and mouth with a scarf.  Sometimes laughing or crying can be a trigger.  Some medicines and food can trigger asthma.

## 2022-05-17 NOTE — PROGRESS NOTES
SUBJECTIVE:   CC: Tri Mejia is an 52 year old woman who presents for preventive health visit.       Patient has been advised of split billing requirements and indicates understanding: Yes  Healthy Habits:     Getting at least 3 servings of Calcium per day:  Yes    Bi-annual eye exam:  Yes    Dental care twice a year:  Yes    Sleep apnea or symptoms of sleep apnea:  None    Diet:  Regular (no restrictions)    Frequency of exercise:  6-7 days/week    Duration of exercise:  30-45 minutes    Taking medications regularly:  Yes    Medication side effects:  Not applicable    PHQ-2 Total Score: 0    Additional concerns today:  No        The 10-year ASCVD risk score (Faraz SIMPSON Jr., et al., 2013) is: 1.5%    Values used to calculate the score:      Age: 52 years      Sex: Female      Is Non- : No      Diabetic: No      Tobacco smoker: No      Systolic Blood Pressure: 128 mmHg      Is BP treated: No      HDL Cholesterol: 64 mg/dL      Total Cholesterol: 228 mg/dL        Today's PHQ-2 Score:   PHQ-2 (  [employer]) 2022   Q1: Little interest or pleasure in doing things 0   Q2: Feeling down, depressed or hopeless 0   PHQ-2 Score 0   PHQ-2 Total Score (12-17 Years)- Positive if 3 or more points; Administer PHQ-A if positive -   Q1: Little interest or pleasure in doing things Not at all   Q2: Feeling down, depressed or hopeless Not at all   PHQ-2 Score 0       Abuse: Current or Past (Physical, Sexual or Emotional) - No  Do you feel safe in your environment? Yes    Have you ever done Advance Care Planning? (For example, a Health Directive, POLST, or a discussion with a medical provider or your loved ones about your wishes): No, advance care planning information given to patient to review.  Patient declined advance care planning discussion at this time.    Social History     Tobacco Use     Smoking status: Former Smoker     Quit date: 2005     Years since quittin.0     Smokeless  tobacco: Never Used   Substance Use Topics     Alcohol use: Yes     Comment:  a couple of beers weeklly     If you drink alcohol do you typically have >3 drinks per day or >7 drinks per week? No    Alcohol Use 5/17/2022   Prescreen: >3 drinks/day or >7 drinks/week? No   Prescreen: >3 drinks/day or >7 drinks/week? -       Reviewed orders with patient.  Reviewed health maintenance and updated orders accordingly - Yes  Lab work is in process  Labs reviewed in EPIC  BP Readings from Last 3 Encounters:   05/17/22 125/85   01/18/21 126/82   11/09/20 114/79    Wt Readings from Last 3 Encounters:   05/17/22 65.8 kg (145 lb)   01/18/21 68.5 kg (151 lb)   01/08/20 65.5 kg (144 lb 4.8 oz)                    Breast Cancer Screening:    FHS-7:   Breast CA Risk Assessment (FHS-7) 9/9/2021 5/17/2022   Did any of your first-degree relatives have breast or ovarian cancer? No No   Did any of your relatives have bilateral breast cancer? No No   Did any man in your family have breast cancer? No No   Did any woman in your family have breast and ovarian cancer? No No   Did any woman in your family have breast cancer before age 50 y? No No   Do you have 2 or more relatives with breast and/or ovarian cancer? No No   Do you have 2 or more relatives with breast and/or bowel cancer? No No       Mammogram Screening: Recommended annual mammography  Pertinent mammograms are reviewed under the imaging tab.    History of abnormal Pap smear: NO - age 30-65 PAP every 5 years with negative HPV co-testing recommended  PAP / HPV Latest Ref Rng & Units 10/9/2018 4/29/2015 3/20/2012   PAP (Historical) - NIL NIL NIL   HPV16 NEG:Negative Negative Negative -   HPV18 NEG:Negative Negative Negative -   HRHPV NEG:Negative Negative Negative -     Reviewed and updated as needed this visit by clinical staff   Tobacco  Allergies  Meds   Med Hx  Surg Hx  Fam Hx  Soc Hx          Reviewed and updated as needed this visit by Provider   Tobacco  Allergies  Meds   Problems  Med Hx  Surg Hx  Fam Hx           Past Medical History:   Diagnosis Date     Allergic rhinitis, cause unspecified     Seasonal     Depressive disorder, not elsewhere classified      PVC (premature ventricular contraction)      RECTAL & ANAL HEMORRHAGE 5/10/2007     Unspecified asthma(493.90) 1995      Past Surgical History:   Procedure Laterality Date     COLONOSCOPY  05/21/2007    Sm internal hemorrhoids, otherwise normal. Repeat age 50.     COLONOSCOPY N/A 11/9/2020    Procedure: COLONOSCOPY WITH POLYPECTOMY;  Surgeon: Melchor Wilkins MD;  Location: [facility]     ESOPHAGOSCOPY, GASTROSCOPY, DUODENOSCOPY (EGD), COMBINED  10/3/2011    Procedure:COMBINED ESOPHAGOSCOPY, GASTROSCOPY, DUODENOSCOPY (EGD), BIOPSY SINGLE OR MULTIPLE; esophagogastroduodenoscopy with biopsy and dilatation, MULTIPLE BX; Surgeon:CHARY MORAN; Location:[facility]     ESOPHAGOSCOPY, GASTROSCOPY, DUODENOSCOPY (EGD), DILATATION, COMBINED  10/3/2011    Procedure:COMBINED ESOPHAGOSCOPY, GASTROSCOPY, DUODENOSCOPY (EGD), DILATATION; Surgeon:CHARY MORAN; Location:[facility]     HC HYSTEROSCOPY W ENDOMETRIAL BX/POLYPECTOMY W/WO D&C  1/19/2007    Hysteroscopy, D&C     HC REMOVAL OF TONSILS,<11 Y/O  1976    Tonsils <12y.o.       Review of Systems   Constitutional: Negative for chills and fever.   HENT: Negative for congestion, ear pain, hearing loss and sore throat.    Eyes: Negative for pain and visual disturbance.   Respiratory: Negative for cough and shortness of breath.    Cardiovascular: Negative for chest pain, palpitations and peripheral edema.   Gastrointestinal: Negative for abdominal pain, constipation, diarrhea, heartburn, hematochezia and nausea.   Breasts:  Negative for tenderness, breast mass and discharge.   Genitourinary: Negative for dysuria, frequency, genital sores, hematuria, pelvic pain, urgency, vaginal bleeding and vaginal discharge.   Musculoskeletal: Negative for arthralgias, joint swelling and myalgias.  "  Skin: Negative for rash.   Neurological: Negative for dizziness, weakness, headaches and paresthesias.   Psychiatric/Behavioral: Negative for mood changes. The patient is not nervous/anxious.           OBJECTIVE:   /86   Pulse 61   Temp 99  F (37.2  C) (Temporal)   Ht 1.571 m (5' 1.85\")   Wt 65.8 kg (145 lb)   LMP 04/16/2022 (Exact Date)   SpO2 100%   BMI 26.65 kg/m    Physical Exam  Constitutional:       General: She is not in acute distress.     Appearance: She is well-developed.   HENT:      Right Ear: External ear normal.      Left Ear: External ear normal.      Nose: Nose normal.      Mouth/Throat:      Pharynx: No oropharyngeal exudate.   Eyes:      General:         Right eye: No discharge.         Left eye: No discharge.      Conjunctiva/sclera: Conjunctivae normal.      Pupils: Pupils are equal, round, and reactive to light.   Neck:      Thyroid: No thyromegaly.      Vascular: No JVD.      Trachea: No tracheal deviation.   Cardiovascular:      Rate and Rhythm: Normal rate and regular rhythm.      Heart sounds: Normal heart sounds. No murmur heard.    No friction rub. No gallop.   Pulmonary:      Effort: Pulmonary effort is normal. No respiratory distress.      Breath sounds: Normal breath sounds. No stridor. No wheezing or rales.   Abdominal:      General: Bowel sounds are normal. There is no distension.      Palpations: Abdomen is soft. There is no mass.      Tenderness: There is no abdominal tenderness. There is no guarding or rebound.      Hernia: No hernia is present.   Musculoskeletal:         General: Normal range of motion.      Cervical back: Normal range of motion and neck supple.   Lymphadenopathy:      Cervical: No cervical adenopathy.   Skin:     General: Skin is warm and dry.   Neurological:      Mental Status: She is alert and oriented to person, place, and time.   Psychiatric:         Behavior: Behavior normal.         Thought Content: Thought content normal.         Judgment: " Judgment normal.     Patient declined breast exam  Pelvic exam not indicated       Diagnostic Test Results:  Labs reviewed in Epic  See orders pending in Epic     ASSESSMENT/PLAN:       ICD-10-CM    1. Routine general medical examination at a health care facility  Z00.00    2. Intermittent asthma, uncomplicated  J45.20 albuterol (PROAIR HFA/PROVENTIL HFA/VENTOLIN HFA) 108 (90 Base) MCG/ACT inhaler   3. Need for hepatitis C screening test  Z11.59 Hepatitis C Screen Reflex to HCV RNA Quant and Genotype     Hepatitis C Screen Reflex to HCV RNA Quant and Genotype   4. Screening for hyperlipidemia  Z13.220 Lipid panel reflex to direct LDL Fasting     Lipid panel reflex to direct LDL Fasting   5. Screening for diabetes mellitus  Z13.1 Glucose     Glucose   6. Pelvic pain in female  R10.2        - Asthma      Intermittent, stable with PRN use of albuterol     Reviewed use and side effects, refilled     - Mild elevation of cholesterol and glucose in the past      ASCVD 1.5%     Will update labs today  - Results will be communicated to patient when available and appropriate medication changes made if necessary     - Pelvic pain      Reports on and off, does seem cycle related but had ablation      Offered US pelvic with transvag, she has had these before, feels hasn't had the pain in awhile but if it comes back she will contact me and I will order US       Patient has been advised of split billing requirements and indicates understanding: Yes    COUNSELING:  Reviewed preventive health counseling, as reflected in patient instructions  Special attention given to:        Regular exercise       Healthy diet/nutrition       Immunizations    Declined: Zoster due to Other will check with insurance            Colorectal Cancer Screening       Consider Hep C screening for all patients one time for ages 18-79 years       Advance Care Planning    Estimated body mass index is 26.65 kg/m  as calculated from the following:    Height as of  "this encounter: 1.571 m (5' 1.85\").    Weight as of this encounter: 65.8 kg (145 lb).    Weight management plan: Discussed healthy diet and exercise guidelines    She reports that she quit smoking about 17 years ago. She has never used smokeless tobacco.      Counseling Resources:  ATP IV Guidelines  Pooled Cohorts Equation Calculator  Breast Cancer Risk Calculator  BRCA-Related Cancer Risk Assessment: FHS-7 Tool  FRAX Risk Assessment  ICSI Preventive Guidelines  Dietary Guidelines for Americans, 2010  USDA's MyPlate  ASA Prophylaxis  Lung CA Screening    Review of the result(s) of each unique test - See list        1/18/21 - BMP, TSH, Lipid        10/9/2018 - PAP   Diagnosis or treatment significantly limited by social determinants of health - None     30 minutes spent on the date of the encounter doing chart review, history and exam, documentation and further activities as noted above    The patient indicates understanding of these issues and agrees with the plan.    Follow up: 1 year or PRN     [facility]  "

## 2022-09-15 ENCOUNTER — OFFICE VISIT (OUTPATIENT)
Dept: FAMILY MEDICINE | Facility: OTHER | Age: 52
End: 2022-09-15
Payer: COMMERCIAL

## 2022-09-15 ENCOUNTER — NURSE TRIAGE (OUTPATIENT)
Dept: FAMILY MEDICINE | Facility: OTHER | Age: 52
End: 2022-09-15

## 2022-09-15 VITALS
HEART RATE: 60 BPM | DIASTOLIC BLOOD PRESSURE: 80 MMHG | RESPIRATION RATE: 24 BRPM | SYSTOLIC BLOOD PRESSURE: 118 MMHG | OXYGEN SATURATION: 99 % | TEMPERATURE: 98.7 F | BODY MASS INDEX: 26.1 KG/M2 | WEIGHT: 142 LBS

## 2022-09-15 DIAGNOSIS — K21.9 GASTROESOPHAGEAL REFLUX DISEASE, UNSPECIFIED WHETHER ESOPHAGITIS PRESENT: ICD-10-CM

## 2022-09-15 DIAGNOSIS — J30.9 ALLERGIC RHINITIS, UNSPECIFIED SEASONALITY, UNSPECIFIED TRIGGER: ICD-10-CM

## 2022-09-15 DIAGNOSIS — J45.20 INTERMITTENT ASTHMA, UNCOMPLICATED: ICD-10-CM

## 2022-09-15 DIAGNOSIS — R06.02 SOB (SHORTNESS OF BREATH): Primary | ICD-10-CM

## 2022-09-15 PROCEDURE — U0003 INFECTIOUS AGENT DETECTION BY NUCLEIC ACID (DNA OR RNA); SEVERE ACUTE RESPIRATORY SYNDROME CORONAVIRUS 2 (SARS-COV-2) (CORONAVIRUS DISEASE [COVID-19]), AMPLIFIED PROBE TECHNIQUE, MAKING USE OF HIGH THROUGHPUT TECHNOLOGIES AS DESCRIBED BY CMS-2020-01-R: HCPCS | Performed by: PHYSICIAN ASSISTANT

## 2022-09-15 PROCEDURE — U0005 INFEC AGEN DETEC AMPLI PROBE: HCPCS | Performed by: PHYSICIAN ASSISTANT

## 2022-09-15 PROCEDURE — 99214 OFFICE O/P EST MOD 30 MIN: CPT | Mod: CS | Performed by: PHYSICIAN ASSISTANT

## 2022-09-15 RX ORDER — FAMOTIDINE 20 MG/1
20 TABLET, FILM COATED ORAL 2 TIMES DAILY PRN
Qty: 180 TABLET | Refills: 1 | Status: SHIPPED | OUTPATIENT
Start: 2022-09-15

## 2022-09-15 RX ORDER — BUDESONIDE AND FORMOTEROL FUMARATE DIHYDRATE 80; 4.5 UG/1; UG/1
2 AEROSOL RESPIRATORY (INHALATION) 2 TIMES DAILY
Qty: 10.2 G | Refills: 1 | Status: SHIPPED | OUTPATIENT
Start: 2022-09-15 | End: 2023-06-01

## 2022-09-15 ASSESSMENT — PAIN SCALES - GENERAL: PAINLEVEL: NO PAIN (0)

## 2022-09-15 NOTE — LETTER
My Asthma Action Plan    Name: Tri Mejia   YOB: 1970  Date: 9/15/2022   My doctor: Tarun Winslow PA-C   My clinic: [facility]        My Rescue Medicine:   Albuterol inhaler (Proair/Ventolin/Proventil HFA)  2-4 puffs EVERY 4 HOURS as needed. Use a spacer if recommended by your provider.   My Asthma Severity:   Intermittent / Exercise Induced  Know your asthma triggers: See attached             GREEN ZONE   Good Control    I feel good    No cough or wheeze    Can work, sleep and play without asthma symptoms       Take your asthma control medicine every day.     1. If exercise triggers your asthma, take your rescue medication    15 minutes before exercise or sports, and    During exercise if you have asthma symptoms  2. Spacer to use with inhaler: If you have a spacer, make sure to use it with your inhaler             YELLOW ZONE Getting Worse  I have ANY of these:    I do not feel good    Cough or wheeze    Chest feels tight    Wake up at night   1. Keep taking your Green Zone medications  2. Start taking your rescue medicine:    every 20 minutes for up to 1 hour. Then every 4 hours for 24-48 hours.    Start controller inhaler per instructions for 2 weeks   3. If you stay in the Yellow Zone for more than 12-24 hours, contact your doctor.  4. If you do not return to the Green Zone in 12-24 hours or you get worse, start taking your oral steroid medicine if prescribed by your provider.           RED ZONE Medical Alert - Get Help  I have ANY of these:    I feel awful    Medicine is not helping    Breathing getting harder    Trouble walking or talking    Nose opens wide to breathe       1. Take your rescue medicine NOW  2. If your provider has prescribed an oral steroid medicine, start taking it NOW  3. Call your doctor NOW  4. If you are still in the Red Zone after 20 minutes and you have not reached your doctor:    Take your rescue medicine again and    Call 911 or  [Work-up necessary to assess local, regional or metastatic recurrence] : Work-up necessary to assess local, regional or metastatic recurrence go to the emergency room right away    See your regular doctor within 2 weeks of an Emergency Room or Urgent Care visit for follow-up treatment.          Annual Reminders:  Meet with Asthma Educator,  Flu Shot in the Fall, consider Pneumonia Vaccination for patients with asthma (aged 19 and older).    Pharmacy:    [id number] - [place], [place] - [address] [facility] [place], [place] - [address] - [place], [place] - [address]    Electronically signed by Tarun Winslow PA-C   Date: 09/15/22                    Asthma Triggers  How To Control Things That Make Your Asthma Worse    Triggers are things that make your asthma worse.  Look at the list below to help you find your triggers and   what you can do about them. You can help prevent asthma flare-ups by staying away from your triggers.      Trigger                                                          What you can do   Cigarette Smoke  Tobacco smoke can make asthma worse. Do not allow smoking in your home, car or around you.  Be sure no one smokes at a child s day care or school.  If you smoke, ask your health care provider for ways to help you quit.  Ask family members to quit too.  Ask your health care provider for a referral to Quit Plan to help you quit smoking, or call 3-728-785-PLAN.     Colds, Flu, Bronchitis  These are common triggers of asthma. Wash your hands often.  Don t touch your eyes, nose or mouth.  Get a flu shot every year.     Dust Mites  These are tiny bugs that live in cloth or carpet. They are too small to see. Wash sheets and blankets in hot water every week.   Encase pillows and mattress in dust mite proof covers.  Avoid having carpet if you can. If you have carpet, vacuum weekly.   Use a dust mask and HEPA vacuum.   Pollen and Outdoor Mold  Some people are allergic to trees, grass, or weed pollen, or molds. Try to keep your windows closed.  Limit time out doors when pollen count is  high.   Ask you health care provider about taking medicine during allergy season.     Animal Dander  Some people are allergic to skin flakes, urine or saliva from pets with fur or feathers. Keep pets with fur or feathers out of your home.    If you can t keep the pet outdoors, then keep the pet out of your bedroom.  Keep the bedroom door closed.  Keep pets off cloth furniture and away from stuffed toys.     Mice, Rats, and Cockroaches  Some people are allergic to the waste from these pests.   Cover food and garbage.  Clean up spills and food crumbs.  Store grease in the refrigerator.   Keep food out of the bedroom.   Indoor Mold  This can be a trigger if your home has high moisture. Fix leaking faucets, pipes, or other sources of water.   Clean moldy surfaces.  Dehumidify basement if it is damp and smelly.   Smoke, Strong Odors, and Sprays  These can reduce air quality. Stay away from strong odors and sprays, such as perfume, powder, hair spray, paints, smoke incense, paint, cleaning products, candles and new carpet.   Exercise or Sports  Some people with asthma have this trigger. Be active!  Ask your doctor about taking medicine before sports or exercise to prevent symptoms.    Warm up for 5-10 minutes before and after sports or exercise.     Other Triggers of Asthma  Cold air:  Cover your nose and mouth with a scarf.  Sometimes laughing or crying can be a trigger.  Some medicines and food can trigger asthma.

## 2022-09-15 NOTE — TELEPHONE ENCOUNTER
Call was unable to be transferred; leaving open for call back from [place].    ROSY Menchaca RN  [facility]

## 2022-09-15 NOTE — PROGRESS NOTES
Assessment & Plan     SOB (shortness of breath)  Intermittent asthma, uncomplicated  Patient has a history of asthma which is regulated with as needed albuterol. She states that overall this has been well controlled. Over the past 3-4 days she has been feeling difficulty with getting in a deep breath. She denies trouble breathing or feeling short of breath with activity. States that if she tries to yawn or deep breathe she will catch herself. She has needed to use the albuterol 2-3x/day over this week. It has been helpful to her symptoms. I will have her begin use of low dose symbicort (or similar ICS if this is too expensive) for the next 2 weeks per yellow zone instructions. She will reach out if not improving as expected.     Hopes to attend a wedding shower this weekend. Will swab for covid to rule out infection. Quarantine until results return.   - budesonide-formoterol (SYMBICORT) 80-4.5 MCG/ACT Inhaler; Inhale 2 puffs into the lungs 2 times daily - during yellow zone for two weeks  - famotidine (PEPCID) 20 MG tablet; Take 1 tablet (20 mg) by mouth 2 times daily as needed (reflux)  - Symptomatic; Yes; 9/12/2022 COVID-19 Virus (Coronavirus) by PCR Nose    Gastroesophageal reflux disease, unspecified whether esophagitis present  Allergic rhinitis  Patient has a history of GERD which had been managed with antacids such as PPI and H2RA. Recently had been instructed to stop PPI due to concerns about complications from long term use. She feels that her reflux symptoms had been worsening. In addition, patient has been experiencing increased allergies with the changes of the season. I will have her begin use of pepcid twice daily for her reflux as well as added benefit for allergies. She has OTC oral antihistamines and nasocort. She will reach out if not improving.   - famotidine (PEPCID) 20 MG tablet; Take 1 tablet (20 mg) by mouth 2 times daily as needed (reflux)    [facility]  CLINIC NAY Bartlett is a 52 year old, presenting for the following health issues:  Breathing Problem      History of Present Illness       Reason for visit:  Shortness of breath  Symptom onset:  1-3 days ago    She eats 2-3 servings of fruits and vegetables daily.She consumes 0 sweetened beverage(s) daily.She exercises with enough effort to increase her heart rate 30 to 60 minutes per day.  She exercises with enough effort to increase her heart rate 6 days per week.   She is taking medications regularly.       SOB began a few days ago  Previous week had been off - cleaning home   fumes  Describes the symptoms less as difficulty breating  More sensation of inability of deep breathing  Has noticed more allergies     Review of Systems   Constitutional, HEENT, cardiovascular, pulmonary, gi and gu systems are negative, except as otherwise noted.      Objective    /80 (BP Location: Right arm, Patient Position: Sitting, Cuff Size: Adult Regular)   Pulse 60   Temp 98.7  F (37.1  C) (Temporal)   Resp 24   Wt 64.4 kg (142 lb)   LMP 07/20/2022   SpO2 99%   BMI 26.10 kg/m    Body mass index is 26.1 kg/m .  Physical Exam   GENERAL: healthy, alert and no distress  EYES: Eyes grossly normal to inspection, PERRL and conjunctivae and sclerae normal  HENT: ear canals and TM's normal, nose and mouth without ulcers or lesions  NECK: no adenopathy, no asymmetry, masses, or scars and thyroid normal to palpation  RESP: lungs clear to auscultation - no rales, rhonchi or wheezes  CV: regular rate and rhythm, normal S1 S2, no S3 or S4, no murmur, click or rub, no peripheral edema and peripheral pulses strong  MS: no gross musculoskeletal defects noted, no edema  PSYCH: mentation appears normal, affect normal/bright    No results found for any visits on 09/15/22.

## 2022-09-15 NOTE — TELEPHONE ENCOUNTER
"  Reason for Disposition    MILD difficulty breathing (e.g., minimal/no SOB at rest, SOB with walking, pulse < 100) of new-onset or worse than normal    Additional Information    Negative: SEVERE difficulty breathing (e.g., struggling for each breath, speaks in single words, pulse > 120)    Negative: Breathing stopped and hasn't returned    Negative: Choking on something    Negative: Bluish (or gray) lips or face    Negative: Difficult to awaken or acting confused (e.g., disoriented, slurred speech)    Negative: Passed out (i.e., fainted, collapsed and was not responding)    Negative: Wheezing started suddenly after medicine, an allergic food, or bee sting    Negative: Stridor    Negative: Slow, shallow and weak breathing    Negative: Sounds like a life-threatening emergency to the triager    Negative: Chest pain    Negative: Wheezing (high pitched whistling sound) and previous asthma attacks or use of asthma medicines    Negative: Difficulty breathing and within 14 days of COVID-19 Exposure    Negative: Difficulty breathing and only present when coughing    Negative: Difficulty breathing and only from stuffy nose    Negative: Difficulty breathing and only from stuffy nose or runny nose from common cold    Negative: MODERATE difficulty breathing (e.g., speaks in phrases, SOB even at rest, pulse 100-120) of new-onset or worse than normal    Negative: Oxygen level (e.g., pulse oximetry) 90 percent or lower    Negative: Wheezing can be heard across the room    Negative: Drooling or spitting out saliva (because can't swallow)    Negative: Any history of prior \"blood clot\" in leg or lungs    Negative: Illness requiring prolonged bedrest in past month (e.g., immobilization, long hospital stay)    Negative: Hip or leg fracture (broken bone) in past month (or had cast on leg or ankle in past month)    Negative: Major surgery in the past month    Negative: Long-distance travel in past month (e.g., car, bus, train, plane; " "with trip lasting 6 or more hours)    Negative: Cancer treatment in past six months (or has cancer now)    Negative: Extra heart beats OR irregular heart beating (i.e., \"palpitations\")    Negative: Fever > 103 F (39.4 C)    Negative: Fever > 101 F (38.3 C) and over 60 years of age    Negative: Fever > 100.0 F (37.8 C) and bedridden (e.g., nursing home patient, stroke, chronic illness, recovering from surgery)    Negative: Fever > 100.0 F (37.8 C) and diabetes mellitus or weak immune system (e.g., HIV positive, cancer chemo, splenectomy, organ transplant, chronic steroids)    Negative: Periods where breathing stops and then resumes normally and bedridden (e.g., nursing home patient, CVA)    Negative: Pregnant or postpartum (from 0 to 6 weeks after delivery)    Negative: Patient sounds very sick or weak to the triager    Answer Assessment - Initial Assessment Questions  1. RESPIRATORY STATUS: \"Describe your breathing?\" (e.g., wheezing, shortness of breath, unable to speak, severe coughing)      No apparent shortness of breath on phone. Has sensation that she cannot take a deep breath. Tired \"as I am not getting enough air\"  2. ONSET: \"When did this breathing problem begin?\"     A few days, about the same  3. PATTERN \"Does the difficult breathing come and go, or has it been constant since it started?\"       Comes and goes  4. SEVERITY: \"How bad is your breathing?\" (e.g., mild, moderate, severe)     - MILD: No SOB at rest, mild SOB with walking, speaks normally in sentences, can lie down, no retractions, pulse < 100.     - MODERATE: SOB at rest, SOB with minimal exertion and prefers to sit, cannot lie down flat, speaks in phrases, mild retractions, audible wheezing, pulse 100-120.     - SEVERE: Very SOB at rest, speaks in single words, struggling to breathe, sitting hunched forward, retractions, pulse > 120      Mostly when up and active  5. RECURRENT SYMPTOM: \"Have you had difficulty breathing before?\" If Yes, ask: " "\"When was the last time?\" and \"What happened that time?\"       Yes in the past but never lasted more than one day so was not seen  6. CARDIAC HISTORY: \"Do you have any history of heart disease?\" (e.g., heart attack, angina, bypass surgery, angioplasty)    no  7. LUNG HISTORY: \"Do you have any history of lung disease?\"  (e.g., pulmonary embolus, asthma, emphysema)     asthma  8. CAUSE: \"What do you think is causing the breathing problem?\"     Allergies/nasal issues  Wondering if acid reflux  Usually asthma is under control, doesn't feel asthma  9. OTHER SYMPTOMS: \"Do you have any other symptoms? (e.g., dizziness, runny nose, cough, chest pain, fever)     Runny nose and allergies  10. O2 SATURATION MONITOR:  \"Do you use an oxygen saturation monitor (pulse oximeter) at home?\" If Yes, \"What is your reading (oxygen level) today?\" \"What is your usual oxygen saturation reading?\" (e.g., 95%)        No monitor     12. TRAVEL: \"Have you traveled out of the country in the last month?\" (e.g., travel history, exposures)       no    Protocols used: BREATHING DIFFICULTY-A-OH    "

## 2022-09-16 LAB — SARS-COV-2 RNA RESP QL NAA+PROBE: NEGATIVE

## 2022-09-26 ENCOUNTER — HOSPITAL ENCOUNTER (EMERGENCY)
Facility: CLINIC | Age: 52
Discharge: HOME | End: 2022-09-26
Attending: FAMILY MEDICINE | Admitting: FAMILY MEDICINE
Payer: COMMERCIAL

## 2022-09-26 VITALS
HEART RATE: 54 BPM | OXYGEN SATURATION: 100 % | SYSTOLIC BLOOD PRESSURE: 155 MMHG | DIASTOLIC BLOOD PRESSURE: 74 MMHG | WEIGHT: 142 LBS | BODY MASS INDEX: 26.1 KG/M2 | RESPIRATION RATE: 17 BRPM | TEMPERATURE: 98 F

## 2022-09-26 DIAGNOSIS — R59.1 LYMPHADENOPATHY: ICD-10-CM

## 2022-09-26 LAB
BASOPHILS # BLD AUTO: 0 10E3/UL (ref 0–0.2)
BASOPHILS NFR BLD AUTO: 0 %
EOSINOPHIL # BLD AUTO: 0.2 10E3/UL (ref 0–0.7)
EOSINOPHIL NFR BLD AUTO: 2 %
ERYTHROCYTE [DISTWIDTH] IN BLOOD BY AUTOMATED COUNT: 12.1 % (ref 10–15)
HCT VFR BLD AUTO: 40.4 % (ref 35–47)
HGB BLD-MCNC: 14.2 G/DL (ref 11.7–15.7)
IMM GRANULOCYTES # BLD: 0 10E3/UL
IMM GRANULOCYTES NFR BLD: 0 %
LYMPHOCYTES # BLD AUTO: 3.4 10E3/UL (ref 0.8–5.3)
LYMPHOCYTES NFR BLD AUTO: 43 %
MCH RBC QN AUTO: 30.4 PG (ref 26.5–33)
MCHC RBC AUTO-ENTMCNC: 35.1 G/DL (ref 31.5–36.5)
MCV RBC AUTO: 87 FL (ref 78–100)
MONOCYTES # BLD AUTO: 0.5 10E3/UL (ref 0–1.3)
MONOCYTES NFR BLD AUTO: 7 %
MONOCYTES NFR BLD AUTO: NEGATIVE %
NEUTROPHILS # BLD AUTO: 3.8 10E3/UL (ref 1.6–8.3)
NEUTROPHILS NFR BLD AUTO: 48 %
NRBC # BLD AUTO: 0 10E3/UL
NRBC BLD AUTO-RTO: 0 /100
PLATELET # BLD AUTO: 316 10E3/UL (ref 150–450)
RBC # BLD AUTO: 4.67 10E6/UL (ref 3.8–5.2)
WBC # BLD AUTO: 7.9 10E3/UL (ref 4–11)

## 2022-09-26 PROCEDURE — 86308 HETEROPHILE ANTIBODY SCREEN: CPT | Performed by: FAMILY MEDICINE

## 2022-09-26 PROCEDURE — 99284 EMERGENCY DEPT VISIT MOD MDM: CPT | Performed by: FAMILY MEDICINE

## 2022-09-26 PROCEDURE — 36415 COLL VENOUS BLD VENIPUNCTURE: CPT | Performed by: FAMILY MEDICINE

## 2022-09-26 PROCEDURE — 85025 COMPLETE CBC W/AUTO DIFF WBC: CPT | Performed by: FAMILY MEDICINE

## 2022-09-26 PROCEDURE — 99283 EMERGENCY DEPT VISIT LOW MDM: CPT | Performed by: FAMILY MEDICINE

## 2022-09-26 NOTE — ED TRIAGE NOTES
Pt presents with sore throat, swollen lymph nodes in her neck and some difficulty breathing spells last few days. Pt recently started on Symbicort for asthma recently. Unsure if she is having an allergic reaction to the medication.      Triage Assessment     Row Name 09/26/22 6170       Triage Assessment (Adult)    Airway WDL WDL       Respiratory WDL    Respiratory WDL WDL       Cardiac WDL    Cardiac WDL WDL

## 2022-09-27 NOTE — DISCHARGE INSTRUCTIONS
Your blood count and mononucleosis screening tests were negative.  Stop your Symbicort for now.  Continue Tylenol and warm compresses as needed.  Follow-up in the clinic in 3 to 4 days if you have any recurrent swelling.  Return to the emergency department if your symptoms worsen.

## 2022-09-27 NOTE — ED PROVIDER NOTES
[facility] ED Provider Note   Patient: Tri Mejia  MRN #:  [id number]  Date of Visit: September 26, 2022    CC:     Chief Complaint   Patient presents with     Lymphadenopathy     Pharyngitis     Breathing Problem     HPI:  Tri Mejia is a 52 year old female who presented to the emergency department with acute onset of swollen lymph glands, and some increased difficulty breathing.  Patient started develop mild sore throat yesterday, and earlier today noticed some significant swelling of glands in her neck.  It was very noticeable to both her and her .  She started to have some increase breathing difficulty.  Patient had an asthma exacerbation 3 weeks ago, was started on Symbicort, and is supposed to stop the Symbicort this Thursday.  She does swish and spit after using the Symbicort.  She has not noticed any sores.  She took some Tylenol, and some warm compresses this afternoon, and by the time she arrived in the emergency department waiting in the ED, her symptoms have improved.  She does not have any fever, chills, abdominal pain.  There has been no other noticeable rash or swelling.  Her tongue and lips are not swollen.    Problem List:  Patient Active Problem List    Diagnosis Date Noted     Pelvic pain in female 08/01/2016     Priority: Medium     GERD (gastroesophageal reflux disease) 03/20/2012     Priority: Medium     Intermittent asthma 03/20/2012     Priority: Medium     Hemorrhage of rectum and anus 05/10/2007     Priority: Medium     Excessive or frequent menstruation 01/02/2007     Priority: Medium     S/p endometrial ablation       Palpitations 05/31/2006     Priority: Medium     Allergic rhinitis 11/06/2003     Priority: Medium     Problem list name updated by automated process. Provider to review         Past Medical History:   Diagnosis Date     Allergic rhinitis, cause unspecified      Depressive disorder,  not elsewhere classified      PVC (premature ventricular contraction)      RECTAL & ANAL HEMORRHAGE 5/10/2007     Unspecified asthma(493.90)        MEDS: albuterol (PROAIR HFA/PROVENTIL HFA/VENTOLIN HFA) 108 (90 Base) MCG/ACT inhaler  budesonide-formoterol (SYMBICORT) 80-4.5 MCG/ACT Inhaler  Cetirizine HCl (ZYRTEC PO)  Cholecalciferol (VITAMIN D3 PO)  famotidine (PEPCID) 20 MG tablet  fish oil-omega-3 fatty acids 1000 MG capsule  Multiple Vitamin (MULTI-VITAMIN DAILY PO)  Omeprazole (PRILOSEC PO)        ALLERGIES:    Allergies   Allergen Reactions     Sulfa Drugs Hives     bactrim       Past Surgical History:   Procedure Laterality Date     COLONOSCOPY  2007    Sm internal hemorrhoids, otherwise normal. Repeat age 50.     COLONOSCOPY N/A 2020    Procedure: COLONOSCOPY WITH POLYPECTOMY;  Surgeon: Melchor Wilkins MD;  Location: [facility]     ESOPHAGOSCOPY, GASTROSCOPY, DUODENOSCOPY (EGD), COMBINED  10/3/2011    Procedure:COMBINED ESOPHAGOSCOPY, GASTROSCOPY, DUODENOSCOPY (EGD), BIOPSY SINGLE OR MULTIPLE; esophagogastroduodenoscopy with biopsy and dilatation, MULTIPLE BX; Surgeon:CHARY MORAN; Location:[facility]     ESOPHAGOSCOPY, GASTROSCOPY, DUODENOSCOPY (EGD), DILATATION, COMBINED  10/3/2011    Procedure:COMBINED ESOPHAGOSCOPY, GASTROSCOPY, DUODENOSCOPY (EGD), DILATATION; Surgeon:CHARY MORAN; Location:[facility]     HC HYSTEROSCOPY W ENDOMETRIAL BX/POLYPECTOMY W/WO D&C  2007    Hysteroscopy, D&C     HC REMOVAL OF TONSILS,<13 Y/O  1976    Tonsils <12y.o.       Social History     Tobacco Use     Smoking status: Former Smoker     Quit date: 2005     Years since quittin.4     Smokeless tobacco: Never Used   Vaping Use     Vaping Use: Never used   Substance Use Topics     Alcohol use: Yes     Comment:  a couple of beers weeklly     Drug use: No         Review of Systems   Except as noted in HPI, all other systems were reviewed and are negative    Physical Exam     Vitals were  reviewed  Patient Vitals for the past 8 hrs:   BP Temp Temp src Pulse Resp SpO2 Weight   09/26/22 1741 (!) 162/81 98  F (36.7  C) Oral 51 18 100 % 64.4 kg (142 lb)     GENERAL APPEARANCE: Alert and oriented x3, no acute distress  FACE: normal facies  EYES: Pupils are equal  HENT: normal external exam; oropharynx is noninjected, no tonsillar enlargement or exudates  NECK: no appreciable lymphadenopathy or asymmetry.  RESP: normal respiratory effort; clear breath sounds bilaterally  CV: regular rate and rhythm; no significant murmurs, gallops or rubs  ABD: soft, no tenderness; no rebound or guarding; bowel sounds are normal  EXT: No calf tenderness or pitting edema  SKIN: no worrisome rash  NEURO: no facial droop; no focal deficits, speech is normal  PSYCH: normal mood and affect      Available Lab/Imaging Results     Results for orders placed or performed during the hospital encounter of 09/26/22 (from the past 24 hour(s))   Mononucleosis screen   Result Value Ref Range    Mononucleosis Screen Negative Negative   CBC with platelets differential    Narrative    The following orders were created for panel order CBC with platelets differential.  Procedure                               Abnormality         Status                     ---------                               -----------         ------                     CBC with platelets and d...[[id number]]                      Final result                 Please view results for these tests on the individual orders.   CBC with platelets and differential   Result Value Ref Range    WBC Count 7.9 4.0 - 11.0 10e3/uL    RBC Count 4.67 3.80 - 5.20 10e6/uL    Hemoglobin 14.2 11.7 - 15.7 g/dL    Hematocrit 40.4 35.0 - 47.0 %    MCV 87 78 - 100 fL    MCH 30.4 26.5 - 33.0 pg    MCHC 35.1 31.5 - 36.5 g/dL    RDW 12.1 10.0 - 15.0 %    Platelet Count 316 150 - 450 10e3/uL    % Neutrophils 48 %    % Lymphocytes 43 %    % Monocytes 7 %    % Eosinophils 2 %    % Basophils 0 %    %  Immature Granulocytes 0 %    NRBCs per 100 WBC 0 <1 /100    Absolute Neutrophils 3.8 1.6 - 8.3 10e3/uL    Absolute Lymphocytes 3.4 0.8 - 5.3 10e3/uL    Absolute Monocytes 0.5 0.0 - 1.3 10e3/uL    Absolute Eosinophils 0.2 0.0 - 0.7 10e3/uL    Absolute Basophils 0.0 0.0 - 0.2 10e3/uL    Absolute Immature Granulocytes 0.0 <=0.4 10e3/uL    Absolute NRBCs 0.0 10e3/uL                Impression     Final diagnoses:   Lymphadenopathy         ED Course & Medical Decision Making   Tri Mejia is a 52 year old female who presented to the emergency department with acute onset of swollen lymph glands with difficulty breathing.  Patient had been on Symbicort for almost 2 weeks, and was not sure if she had an allergic reaction.  She had some difficulty breathing associated with a swollen glands around her neck.  This is improved since taking ibuprofen and using some warm compresses.  Patient was seen shortly after arrival.  Temp is 98 degrees, blood pressure 162/81.  Exam reveals no significant distinct cervical adenopathy.  Oropharynx is noninjected.  Lungs are clear without wheezes.  Abdomen was soft and nontender without any hepatosplenomegaly.  CBC reveals a normal white blood count of 7.9 with normal differential.  There is 40% neutrophils and 43% lymphocytes and a hemoglobin of 14.2.  Mononucleosis screen is negative.  I recommended monitoring for further symptoms.  Discontinue the Symbicort since her asthma flareup has improved.  She is only using albuterol inhaler as needed.  Monitor for further symptoms and return as needed.        Written after-visit summary and instructions were given at the time of discharge.    Follow up Plan:   Renetta Pierce, NATALY  [address]  [place] [address]  [phone]    In 3 days  if not improving    [facility] Emergency [facility]  [address] [place] [address] [phone]    If symptoms worsen      Discharge Instructions:    Your blood count and mononucleosis screening tests were negative.  Stop your Symbicort for now.  Continue Tylenol and warm compresses as needed.  Follow-up in the clinic in 3 to 4 days if you have any recurrent swelling.  Return to the emergency department if your symptoms worsen.       Disclaimer: This note consists of words and symbols derived from keyboarding and dictation using voice recognition software.  As a result, there may be errors that have gone undetected.  Please consider this when interpreting information found in this note.       Phoebe Chandra MD  09/26/22 2013

## 2022-10-04 ENCOUNTER — HOSPITAL ENCOUNTER (OUTPATIENT)
Dept: MAMMOGRAPHY | Facility: CLINIC | Age: 52
Discharge: HOME | End: 2022-10-04
Admitting: PHYSICIAN ASSISTANT
Payer: COMMERCIAL

## 2022-10-04 DIAGNOSIS — Z12.31 VISIT FOR SCREENING MAMMOGRAM: ICD-10-CM

## 2022-10-04 PROCEDURE — 77067 SCR MAMMO BI INCL CAD: CPT

## 2022-10-09 ENCOUNTER — HEALTH MAINTENANCE LETTER (OUTPATIENT)
Age: 52
End: 2022-10-09

## 2023-04-20 ENCOUNTER — PATIENT OUTREACH (OUTPATIENT)
Dept: CARE COORDINATION | Facility: CLINIC | Age: 53
End: 2023-04-20
Payer: COMMERCIAL

## 2023-05-17 ENCOUNTER — VIRTUAL VISIT (OUTPATIENT)
Dept: FAMILY MEDICINE | Facility: CLINIC | Age: 53
End: 2023-05-17
Payer: COMMERCIAL

## 2023-05-17 DIAGNOSIS — J45.21 MILD INTERMITTENT ASTHMA WITH ACUTE EXACERBATION: Primary | ICD-10-CM

## 2023-05-17 PROCEDURE — 99213 OFFICE O/P EST LOW 20 MIN: CPT | Mod: VID | Performed by: PHYSICIAN ASSISTANT

## 2023-05-17 RX ORDER — PSEUDOEPHEDRINE HCL 120 MG/1
120 TABLET, FILM COATED, EXTENDED RELEASE ORAL EVERY 12 HOURS
Qty: 28 TABLET | Refills: 0 | Status: SHIPPED | OUTPATIENT
Start: 2023-05-17 | End: 2023-06-01

## 2023-05-17 RX ORDER — AZITHROMYCIN 250 MG/1
TABLET, FILM COATED ORAL
Qty: 6 TABLET | Refills: 0 | Status: SHIPPED | OUTPATIENT
Start: 2023-05-17 | End: 2023-05-22

## 2023-05-17 RX ORDER — GUAIFENESIN AND DEXTROMETHORPHAN HYDROBROMIDE 1200; 60 MG/1; MG/1
1 TABLET, EXTENDED RELEASE ORAL 2 TIMES DAILY
Qty: 28 TABLET | Refills: 0 | Status: SHIPPED | OUTPATIENT
Start: 2023-05-17 | End: 2023-06-01

## 2023-05-17 ASSESSMENT — ASTHMA QUESTIONNAIRES
QUESTION_5 LAST FOUR WEEKS HOW WOULD YOU RATE YOUR ASTHMA CONTROL: COMPLETELY CONTROLLED
ACT_TOTALSCORE: 22
QUESTION_3 LAST FOUR WEEKS HOW OFTEN DID YOUR ASTHMA SYMPTOMS (WHEEZING, COUGHING, SHORTNESS OF BREATH, CHEST TIGHTNESS OR PAIN) WAKE YOU UP AT NIGHT OR EARLIER THAN USUAL IN THE MORNING: NOT AT ALL
ACT_TOTALSCORE: 22
QUESTION_1 LAST FOUR WEEKS HOW MUCH OF THE TIME DID YOUR ASTHMA KEEP YOU FROM GETTING AS MUCH DONE AT WORK, SCHOOL OR AT HOME: NONE OF THE TIME
QUESTION_2 LAST FOUR WEEKS HOW OFTEN HAVE YOU HAD SHORTNESS OF BREATH: NOT AT ALL
QUESTION_4 LAST FOUR WEEKS HOW OFTEN HAVE YOU USED YOUR RESCUE INHALER OR NEBULIZER MEDICATION (SUCH AS ALBUTEROL): ONE OR TWO TIMES PER DAY

## 2023-05-17 NOTE — PATIENT INSTRUCTIONS
At [facility], we strive to deliver an exceptional experience to you, every time we see you. If you receive a survey, please complete it as we do value your feedback.  If you have MyChart, you can expect to receive results automatically within 24 hours of their completion.  Your provider will send a note interpreting your results as well.   If you do not have MyChart, you should receive your results in about a week by mail.    Your care team:                            Family Medicine Internal Medicine   MD Grabiel Delgado MD Shantel Branch-Fleming, MD Srinivasa Vaka, MD Katya Belousova, PAROMELIA Avalos CNP, MD ([facility]   Jeremy Lewis, MD Meagan Dixon MD Amelia Massimini APRN LISA Lew APRN MD Solomon Lee MD          Clinic hours: Monday - Thursday 7 am-6 pm; Fridays 7 am-5 pm.   Urgent care: Monday - Friday 10 am- 8 pm; Saturday and Sunday 9 am-5 pm.    Clinic: [phone]       [facility]: Monday - Thursday 8 am - 7 pm; Friday 8 am - 6 pm  [facility]: [phone]

## 2023-05-17 NOTE — PROGRESS NOTES
"Tri is a 53 year old who is being evaluated via a billable video visit.      How would you like to obtain your AVS? MyChart  If the video visit is dropped, the invitation should be resent by: Send to e-mail at: [email or web address]  Will anyone else be joining your video visit? No    Assessment & Plan   Problem List Items Addressed This Visit        Respiratory    Intermittent asthma - Primary    Relevant Medications    Dextromethorphan-Guaifenesin  MG TB12    pseudoePHEDrine (SUDAFED) 120 MG 12 hr tablet    azithromycin (ZITHROMAX) 250 MG tablet      zpack  mucinex dm and sudafed twice a day for 7-14 days  Albuterol 2 puffs every 4 hours as needed     12 minutes spent by me on the date of the encounter doing chart review, history and exam, documentation and further activities per the note       BMI:   Estimated body mass index is 26.1 kg/m  as calculated from the following:    Height as of 5/17/22: 1.571 m (5' 1.85\").    Weight as of 9/26/22: 64.4 kg (142 lb).           Ana Licona PA-C  [facility]    Marko Bartlett is a 53 year old, presenting for the following health issues:  Cold Symptoms    History of Present Illness       Reason for visit:  Cold that my now be an infection.  Symptom onset:  1-2 weeks ago  Symptoms include:  Phelgm in chest  Symptom intensity:  Moderate  Symptom progression:  Worsening  Had these symptoms before:  No    She eats 2-3 servings of fruits and vegetables daily.She consumes 0 sweetened beverage(s) daily.She exercises with enough effort to increase her heart rate 30 to 60 minutes per day.  She exercises with enough effort to increase her heart rate 5 days per week.   She is taking medications regularly.     Acute Illness  Acute illness concerns: uri with severe PND and asthma exacerbation  Onset/Duration: 2 weeks ago  Symptoms:  Fever: No  Chills/Sweats: No  Headache (location?): No  Sinus Pressure: No  Conjunctivitis:  No  Ear " Pain: no  Rhinorrhea: YES  Congestion: YES  Sore Throat: YES  Cough: YES-productive of green sputum  Wheeze: YES, mild. patient denies chest pain or shortness of breath   Decreased Appetite: No  Nausea: No  Vomiting: No  Diarrhea: No  Dysuria/Freq.: No  Dysuria or Hematuria: No  Fatigue/Achiness: No  Sick/Strep Exposure: No  Therapies tried and outcome: cough syrup      Review of Systems   Constitutional, HEENT, cardiovascular, pulmonary, gi and gu systems are negative, except as otherwise noted.      Objective    Vitals - Patient Reported  SpO2 (Patient Reported): 98  Temperature (Patient Reported): 97.7  F (36.5  C) (Oral)    Vitals:  No vitals were obtained today due to virtual visit.    Physical Exam   GENERAL: Healthy, alert and no distress  EYES: Eyes grossly normal to inspection.  No discharge or erythema, or obvious scleral/conjunctival abnormalities.  RESP: No audible wheeze, cough, or visible cyanosis.  No visible retractions or increased work of breathing.    SKIN: Visible skin clear. No significant rash, abnormal pigmentation or lesions.  NEURO: Cranial nerves grossly intact.  Mentation and speech appropriate for age.  PSYCH: Mentation appears normal, affect normal/bright, judgement and insight intact, normal speech and appearance well-groomed.                Video-Visit Details    Type of service:  Video Visit     Originating Location (pt. Location): Home  Distant Location (provider location):  Off-site  Platform used for Video Visit: [employer]

## 2023-05-25 ASSESSMENT — ENCOUNTER SYMPTOMS
CONSTIPATION: 0
SHORTNESS OF BREATH: 0
ABDOMINAL PAIN: 0
DYSURIA: 0
COUGH: 0
ARTHRALGIAS: 0
FEVER: 0
EYE PAIN: 0
HEMATURIA: 0
JOINT SWELLING: 0
WEAKNESS: 0
HEARTBURN: 0
HEADACHES: 0
DIARRHEA: 0
BREAST MASS: 0
MYALGIAS: 0
PARESTHESIAS: 0
PALPITATIONS: 0
NAUSEA: 0
FREQUENCY: 0
CHILLS: 0
SORE THROAT: 0
NERVOUS/ANXIOUS: 0
HEMATOCHEZIA: 0
DIZZINESS: 0

## 2023-06-01 ENCOUNTER — OFFICE VISIT (OUTPATIENT)
Dept: FAMILY MEDICINE | Facility: OTHER | Age: 53
End: 2023-06-01
Payer: COMMERCIAL

## 2023-06-01 VITALS
BODY MASS INDEX: 26.5 KG/M2 | RESPIRATION RATE: 16 BRPM | DIASTOLIC BLOOD PRESSURE: 76 MMHG | HEART RATE: 50 BPM | OXYGEN SATURATION: 96 % | WEIGHT: 144 LBS | SYSTOLIC BLOOD PRESSURE: 110 MMHG | HEIGHT: 62 IN | TEMPERATURE: 97.6 F

## 2023-06-01 DIAGNOSIS — J45.20 INTERMITTENT ASTHMA, UNCOMPLICATED: ICD-10-CM

## 2023-06-01 DIAGNOSIS — R73.9 HYPERGLYCEMIA: ICD-10-CM

## 2023-06-01 DIAGNOSIS — J45.21 MILD INTERMITTENT ASTHMA WITH ACUTE EXACERBATION: ICD-10-CM

## 2023-06-01 DIAGNOSIS — Z12.4 CERVICAL CANCER SCREENING: ICD-10-CM

## 2023-06-01 DIAGNOSIS — E78.5 HYPERLIPIDEMIA LDL GOAL <100: ICD-10-CM

## 2023-06-01 DIAGNOSIS — Z00.00 ROUTINE GENERAL MEDICAL EXAMINATION AT A HEALTH CARE FACILITY: Primary | ICD-10-CM

## 2023-06-01 LAB
ALBUMIN SERPL BCG-MCNC: 4.6 G/DL (ref 3.5–5.2)
ALP SERPL-CCNC: 53 U/L (ref 35–104)
ALT SERPL W P-5'-P-CCNC: 24 U/L (ref 10–35)
ANION GAP SERPL CALCULATED.3IONS-SCNC: 8 MMOL/L (ref 7–15)
AST SERPL W P-5'-P-CCNC: 29 U/L (ref 10–35)
BILIRUB SERPL-MCNC: 0.4 MG/DL
BUN SERPL-MCNC: 10.9 MG/DL (ref 6–20)
CALCIUM SERPL-MCNC: 9.7 MG/DL (ref 8.6–10)
CHLORIDE SERPL-SCNC: 105 MMOL/L (ref 98–107)
CHOLEST SERPL-MCNC: 250 MG/DL
CREAT SERPL-MCNC: 0.73 MG/DL (ref 0.51–0.95)
DEPRECATED HCO3 PLAS-SCNC: 28 MMOL/L (ref 22–29)
GFR SERPL CREATININE-BSD FRML MDRD: >90 ML/MIN/1.73M2
GLUCOSE SERPL-MCNC: 102 MG/DL (ref 70–99)
HBA1C MFR BLD: 5.5 % (ref 0–5.6)
HDLC SERPL-MCNC: 60 MG/DL
LDLC SERPL CALC-MCNC: 162 MG/DL
NONHDLC SERPL-MCNC: 190 MG/DL
POTASSIUM SERPL-SCNC: 3.9 MMOL/L (ref 3.4–5.3)
PROT SERPL-MCNC: 7.2 G/DL (ref 6.4–8.3)
SODIUM SERPL-SCNC: 141 MMOL/L (ref 136–145)
TRIGL SERPL-MCNC: 142 MG/DL

## 2023-06-01 PROCEDURE — 83036 HEMOGLOBIN GLYCOSYLATED A1C: CPT | Performed by: FAMILY MEDICINE

## 2023-06-01 PROCEDURE — 80053 COMPREHEN METABOLIC PANEL: CPT | Performed by: FAMILY MEDICINE

## 2023-06-01 PROCEDURE — 99396 PREV VISIT EST AGE 40-64: CPT | Performed by: FAMILY MEDICINE

## 2023-06-01 PROCEDURE — 36415 COLL VENOUS BLD VENIPUNCTURE: CPT | Performed by: FAMILY MEDICINE

## 2023-06-01 PROCEDURE — 80061 LIPID PANEL: CPT | Performed by: FAMILY MEDICINE

## 2023-06-01 PROCEDURE — G0145 SCR C/V CYTO,THINLAYER,RESCR: HCPCS | Performed by: FAMILY MEDICINE

## 2023-06-01 PROCEDURE — 87624 HPV HI-RISK TYP POOLED RSLT: CPT | Performed by: FAMILY MEDICINE

## 2023-06-01 RX ORDER — FLUTICASONE PROPIONATE 50 MCG
1 SPRAY, SUSPENSION (ML) NASAL DAILY
COMMUNITY

## 2023-06-01 RX ORDER — ALBUTEROL SULFATE 90 UG/1
2 AEROSOL, METERED RESPIRATORY (INHALATION) EVERY 6 HOURS
Qty: 18 G | Refills: 11 | Status: SHIPPED | OUTPATIENT
Start: 2023-06-01 | End: 2024-06-04

## 2023-06-01 ASSESSMENT — ENCOUNTER SYMPTOMS
HEARTBURN: 0
HEADACHES: 0
SORE THROAT: 0
JOINT SWELLING: 0
COUGH: 0
BREAST MASS: 0
EYE PAIN: 0
CHILLS: 0
SHORTNESS OF BREATH: 0
NERVOUS/ANXIOUS: 0
CONSTIPATION: 0
NAUSEA: 0
DIZZINESS: 0
DYSURIA: 0
WEAKNESS: 0
HEMATOCHEZIA: 0
PARESTHESIAS: 0
FEVER: 0
MYALGIAS: 0
ABDOMINAL PAIN: 0
ARTHRALGIAS: 0
DIARRHEA: 0
PALPITATIONS: 0
FREQUENCY: 0
HEMATURIA: 0

## 2023-06-01 ASSESSMENT — PAIN SCALES - GENERAL: PAINLEVEL: NO PAIN (0)

## 2023-06-01 NOTE — RESULT ENCOUNTER NOTE
Ms. Mejia    Your recent test results are attached.  Labs show moderately elevated cholesterol levels which seem to have worsened compared to last test.  Marginal elevated blood glucose noted.  However hemoglobin A1c is within normal limits showing no concern for diabetes.  Aggressive diet and lifestyle modifications including low-fat diet, moderate intensity physical exercise most days of the week should help improve this.  I would recommend a recheck in 1 year for follow-up.    If you have any questions or concerns please contact me via My Chart or call the clinic at [phone]     Thank You  Brooklyn Velasco MD.

## 2023-06-01 NOTE — PROGRESS NOTES
SUBJECTIVE:   CC: Tri is an 53 year old who presents for preventive health visit.       2023     8:24 AM   Additional Questions   Roomed by mark Boone 2023   Any forms needing to be completed Yes     Healthy Habits:    Getting at least 3 servings of Calcium per day:  Yes    Bi-annual eye exam:  Yes    Dental care twice a year:  Yes    Sleep apnea or symptoms of sleep apnea:  None    Diet:  Other    Frequency of exercise:  6-7 days/week    Duration of exercise:  30-45 minutes    Taking medications regularly:  Yes    PHQ-2 Total Score:    Additional concerns today:  No        Social History     Tobacco Use     Smoking status: Former     Packs/day: 0.50     Years: 5.00     Pack years: 2.50     Types: Cigarettes     Quit date: 2005     Years since quittin.1     Smokeless tobacco: Never     Tobacco comments:     smoked for intermittently, no longer do.   Vaping Use     Vaping status: Never Used   Substance Use Topics     Alcohol use: Yes     Comment:  a couple of beers weeklly             2023     3:17 PM   Alcohol Use   Prescreen: >3 drinks/day or >7 drinks/week? No     Reviewed orders with patient.  Reviewed health maintenance and updated orders accordingly - Yes  Labs reviewed in EPIC  BP Readings from Last 3 Encounters:   23 110/76   22 (!) 155/74   09/15/22 118/80    Wt Readings from Last 3 Encounters:   23 65.3 kg (144 lb)   22 64.4 kg (142 lb)   09/15/22 64.4 kg (142 lb)                  Patient Active Problem List   Diagnosis     Allergic rhinitis     External hemorrhoids     GERD (gastroesophageal reflux disease)     Intermittent asthma     Hyperlipidemia LDL goal <100     Past Surgical History:   Procedure Laterality Date     COLONOSCOPY  2007    Sm internal hemorrhoids, otherwise normal. Repeat age 50.     COLONOSCOPY N/A 2020    Procedure: COLONOSCOPY WITH POLYPECTOMY;  Surgeon: Melchor Wilkins MD;  Location:  GI     COSMETIC  SURGERY      liposuction     ESOPHAGOSCOPY, GASTROSCOPY, DUODENOSCOPY (EGD), COMBINED  10/03/2011    Procedure:COMBINED ESOPHAGOSCOPY, GASTROSCOPY, DUODENOSCOPY (EGD), BIOPSY SINGLE OR MULTIPLE; esophagogastroduodenoscopy with biopsy and dilatation, MULTIPLE BX; Surgeon:CHARY MORAN; Location:[facility]     ESOPHAGOSCOPY, GASTROSCOPY, DUODENOSCOPY (EGD), DILATATION, COMBINED  10/03/2011    Procedure:COMBINED ESOPHAGOSCOPY, GASTROSCOPY, DUODENOSCOPY (EGD), DILATATION; Surgeon:CHARY MORAN; Location:[facility]     HC HYSTEROSCOPY W ENDOMETRIAL BX/POLYPECTOMY W/WO D&C  2007    Hysteroscopy, D&C     HC REMOVAL OF TONSILS,<11 Y/O  1976    Tonsils <12y.o.       Social History     Tobacco Use     Smoking status: Former     Packs/day: 0.50     Years: 5.00     Pack years: 2.50     Types: Cigarettes     Quit date: 2005     Years since quittin.1     Smokeless tobacco: Never     Tobacco comments:     smoked for intermittently, no longer do.   Vaping Use     Vaping status: Never Used   Substance Use Topics     Alcohol use: Yes     Comment:  a couple of beers weeklly     Family History   Problem Relation Age of Onset     Lipids Mother      Connective Tissue Disorder Mother         osteopenia     Alzheimer Disease Mother      Hypertension Father      Arthritis Father      Lipids Father      Cerebrovascular Disease Paternal Grandmother      Cerebrovascular Disease Paternal Grandfather      Depression Sister      Depression Brother      Lipids Sister      C.A.D. Sister          of MI at 42. obese and developmental delay         Current Outpatient Medications   Medication Sig Dispense Refill     albuterol (PROAIR HFA/PROVENTIL HFA/VENTOLIN HFA) 108 (90 Base) MCG/ACT inhaler Inhale 2 puffs into the lungs every 6 hours 18 g 11     Cetirizine HCl (ZYRTEC PO)        Cholecalciferol (VITAMIN D3 PO) Take by mouth daily       famotidine (PEPCID) 20 MG tablet Take 1 tablet (20 mg) by mouth 2 times daily as  needed (reflux) 180 tablet 1     fish oil-omega-3 fatty acids 1000 MG capsule Take 1 g by mouth daily       fluticasone (FLONASE) 50 MCG/ACT nasal spray Spray 1 spray into both nostrils daily       Multiple Vitamin (MULTI-VITAMIN DAILY PO) Take  by mouth.       Allergies   Allergen Reactions     Sulfa Antibiotics Hives     bactrim       Breast Cancer Screening:    FHS-7:       9/9/2021     8:15 AM 5/17/2022     7:12 AM 10/4/2022     8:23 AM 5/25/2023     3:19 PM   Breast CA Risk Assessment (FHS-7)   Did any of your first-degree relatives have breast or ovarian cancer? No No No No   Did any of your relatives have bilateral breast cancer? No No No No   Did any man in your family have breast cancer? No No No No   Did any woman in your family have breast and ovarian cancer? No No No No   Did any woman in your family have breast cancer before age 50 y? No No No No   Do you have 2 or more relatives with breast and/or ovarian cancer? No No No No   Do you have 2 or more relatives with breast and/or bowel cancer? No No No No     click delete button to remove this line now  Mammogram Screening: Recommended annual mammography  Pertinent mammograms are reviewed under the imaging tab.    History of abnormal Pap smear: NO - age 30-65 PAP every 5 years with negative HPV co-testing recommended      Latest Ref Rng & Units 10/9/2018    10:10 AM 10/9/2018    10:00 AM 4/29/2015     8:45 AM   PAP / HPV   PAP (Historical)  NIL       HPV 16 DNA NEG^Negative  Negative   Negative     HPV 18 DNA NEG^Negative  Negative   Negative     Other HR HPV NEG^Negative  Negative   Negative       Reviewed and updated as needed this visit by clinical staff   Tobacco  Allergies  Meds              Reviewed and updated as needed this visit by Provider                 Past Medical History:   Diagnosis Date     Allergic rhinitis, cause unspecified     Seasonal     Depressive disorder 1995    Don't currently have.     Depressive disorder, not elsewhere  classified      PVC (premature ventricular contraction)      RECTAL & ANAL HEMORRHAGE 05/10/2007     Uncomplicated asthma 1993     Unspecified asthma(493.90) 01/01/1995      Past Surgical History:   Procedure Laterality Date     COLONOSCOPY  05/21/2007    Sm internal hemorrhoids, otherwise normal. Repeat age 50.     COLONOSCOPY N/A 11/09/2020    Procedure: COLONOSCOPY WITH POLYPECTOMY;  Surgeon: Melchor Wilkins MD;  Location:  GI     COSMETIC SURGERY      liposuction     ESOPHAGOSCOPY, GASTROSCOPY, DUODENOSCOPY (EGD), COMBINED  10/03/2011    Procedure:COMBINED ESOPHAGOSCOPY, GASTROSCOPY, DUODENOSCOPY (EGD), BIOPSY SINGLE OR MULTIPLE; esophagogastroduodenoscopy with biopsy and dilatation, MULTIPLE BX; Surgeon:CHARY MORAN; Location: GI     ESOPHAGOSCOPY, GASTROSCOPY, DUODENOSCOPY (EGD), DILATATION, COMBINED  10/03/2011    Procedure:COMBINED ESOPHAGOSCOPY, GASTROSCOPY, DUODENOSCOPY (EGD), DILATATION; Surgeon:CHARY MORAN; Location: GI     HC HYSTEROSCOPY W ENDOMETRIAL BX/POLYPECTOMY W/WO D&C  01/19/2007    Hysteroscopy, D&C     HC REMOVAL OF TONSILS,<13 Y/O  01/01/1976    Tonsils <12y.o.       Review of Systems   Constitutional: Negative for chills and fever.   HENT: Negative for congestion, ear pain, hearing loss and sore throat.    Eyes: Negative for pain and visual disturbance.   Respiratory: Negative for cough and shortness of breath.    Cardiovascular: Negative for chest pain, palpitations and peripheral edema.   Gastrointestinal: Negative for abdominal pain, constipation, diarrhea, heartburn, hematochezia and nausea.   Breasts:  Negative for tenderness, breast mass and discharge.   Genitourinary: Negative for dysuria, frequency, genital sores, hematuria, pelvic pain, urgency, vaginal bleeding and vaginal discharge.   Musculoskeletal: Negative for arthralgias, joint swelling and myalgias.   Skin: Negative for rash.   Neurological: Negative for dizziness, weakness, headaches and  "paresthesias.   Psychiatric/Behavioral: Negative for mood changes. The patient is not nervous/anxious.           OBJECTIVE:   /76 (BP Location: Left arm, Patient Position: Sitting, Cuff Size: Adult Regular)   Pulse 50   Temp 97.6  F (36.4  C) (Temporal)   Resp 16   Ht 1.57 m (5' 1.81\")   Wt 65.3 kg (144 lb)   LMP 07/01/2022   SpO2 96%   BMI 26.50 kg/m    Physical Exam  GENERAL: healthy, alert and no distress  EYES: Eyes grossly normal to inspection, PERRL and conjunctivae and sclerae normal  HENT: ear canals and TM's normal, nose and mouth without ulcers or lesions  NECK: no adenopathy, no asymmetry, masses, or scars and thyroid normal to palpation  RESP: lungs clear to auscultation - no rales, rhonchi or wheezes  CV: regular rate and rhythm, normal S1 S2, no S3 or S4, no murmur, click or rub, no peripheral edema and peripheral pulses strong  ABDOMEN: soft, nontender, no hepatosplenomegaly, no masses and bowel sounds normal  MS: no gross musculoskeletal defects noted, no edema  SKIN: no suspicious lesions or rashes  NEURO: Normal strength and tone, mentation intact and speech normal  PSYCH: mentation appears normal, affect normal/bright    Diagnostic Test Results:  Labs reviewed in Epic    ASSESSMENT/PLAN:     Problem List Items Addressed This Visit     Intermittent asthma     Stable . Uses albuterol with exercise         Relevant Medications    albuterol (PROAIR HFA/PROVENTIL HFA/VENTOLIN HFA) 108 (90 Base) MCG/ACT inhaler    fluticasone (FLONASE) 50 MCG/ACT nasal spray    Hyperlipidemia LDL goal <100    Relevant Orders    Lipid panel reflex to direct LDL Fasting (Completed)    Comprehensive metabolic panel (BMP + Alb, Alk Phos, ALT, AST, Total. Bili, TP) (Completed)   Other Visit Diagnoses     Routine general medical examination at a health care facility    -  Primary    Cervical cancer screening        Relevant Orders    Pap Screen with HPV - recommended age 30 - 65 years    Intermittent asthma, " uncomplicated        Relevant Medications    albuterol (PROAIR HFA/PROVENTIL HFA/VENTOLIN HFA) 108 (90 Base) MCG/ACT inhaler    fluticasone (FLONASE) 50 MCG/ACT nasal spray    Hyperglycemia        Relevant Orders    Hemoglobin A1c (Completed)          COUNSELING:  Reviewed preventive health counseling, as reflected in patient instructions       Regular exercise       Healthy diet/nutrition        She reports that she quit smoking about 18 years ago. Her smoking use included cigarettes. She has a 2.50 pack-year smoking history. She has never used smokeless tobacco.      Brooklyn Velasco MD  [facility]

## 2023-06-05 LAB
BKR LAB AP GYN ADEQUACY: NORMAL
BKR LAB AP GYN INTERPRETATION: NORMAL
BKR LAB AP HPV REFLEX: NORMAL
BKR LAB AP PREVIOUS ABNORMAL: NORMAL
PATH REPORT.COMMENTS IMP SPEC: NORMAL
PATH REPORT.COMMENTS IMP SPEC: NORMAL
PATH REPORT.RELEVANT HX SPEC: NORMAL

## 2023-06-06 LAB
HUMAN PAPILLOMA VIRUS 16 DNA: NEGATIVE
HUMAN PAPILLOMA VIRUS 18 DNA: NEGATIVE
HUMAN PAPILLOMA VIRUS FINAL DIAGNOSIS: NORMAL
HUMAN PAPILLOMA VIRUS OTHER HR: NEGATIVE

## 2023-08-14 ENCOUNTER — MYC MEDICAL ADVICE (OUTPATIENT)
Dept: FAMILY MEDICINE | Facility: OTHER | Age: 53
End: 2023-08-14
Payer: COMMERCIAL

## 2023-09-05 ENCOUNTER — PATIENT OUTREACH (OUTPATIENT)
Dept: CARE COORDINATION | Facility: CLINIC | Age: 53
End: 2023-09-05
Payer: COMMERCIAL

## 2023-10-03 ENCOUNTER — PATIENT OUTREACH (OUTPATIENT)
Dept: CARE COORDINATION | Facility: CLINIC | Age: 53
End: 2023-10-03
Payer: COMMERCIAL

## 2023-10-19 ENCOUNTER — HOSPITAL ENCOUNTER (OUTPATIENT)
Dept: MAMMOGRAPHY | Facility: CLINIC | Age: 53
Discharge: HOME | End: 2023-10-19
Attending: PHYSICIAN ASSISTANT | Admitting: PHYSICIAN ASSISTANT
Payer: COMMERCIAL

## 2023-10-19 DIAGNOSIS — Z12.31 VISIT FOR SCREENING MAMMOGRAM: ICD-10-CM

## 2023-10-19 PROCEDURE — 77067 SCR MAMMO BI INCL CAD: CPT

## 2023-12-13 ENCOUNTER — TELEPHONE (OUTPATIENT)
Dept: FAMILY MEDICINE | Facility: CLINIC | Age: 53
End: 2023-12-13
Payer: COMMERCIAL

## 2023-12-13 NOTE — LETTER
December 13, 2023    Tri Moore [place]  [id number] [facility] [address]    Dear Tri,    At [facility] we care about your health and are committed to providing quality patient care.     Here is a list of Health Maintenance topics that are due now or due soon:  Health Maintenance Due   Topic Date Due    HEPATITIS B IMMUNIZATION (1 of 3 - 3-dose series) Never done    ZOSTER IMMUNIZATION (1 of 2) Never done    INFLUENZA VACCINE (1) 09/01/2023    COVID-19 Vaccine (5 - 2023-24 season) 09/01/2023    ASTHMA ACTION PLAN  09/15/2023    ASTHMA CONTROL TEST  11/17/2023        We are recommending that you:  Complete the attached ASTHMA CONTROL TEST.  If your total score is 19 or less or you have been to the ER or urgent care for your asthma, then please schedule an asthma followup appointment with your primary care provider.        Schedule a Nurse-Only appointment to update your immunizations: Your records indicate that you are not up to date with your immunizations, please schedule a nurse-only appointment to get these updated or update them at your next office visit. If this is incorrect, please disregard.    To schedule an appointment or discuss this further, you may contact us by phone at the [facility] at [phone] or online through the patient [email or web address] @ [email or web address]    Thank you for trusting [facility] and we appreciate the opportunity to serve you.  We look forward to supporting your healthcare needs in the future.    Your partners in health,      Quality Committee at [facility]

## 2023-12-13 NOTE — TELEPHONE ENCOUNTER
Patient Quality Outreach    Patient is due for the following:   Asthma  -  ACT needed      Topic Date Due    Hepatitis B Vaccine (1 of 3 - 3-dose series) Never done    Zoster (Shingles) Vaccine (1 of 2) Never done    Flu Vaccine (1) 09/01/2023    COVID-19 Vaccine (5 - 2023-24 season) 09/01/2023       Next Steps:   Schedule a nurse only visit for vaccines    Type of outreach:    Sent letter. and Copy of ACT mailed to patient.      Questions for provider review:    None           Destiny Petersen MA

## 2023-12-28 ASSESSMENT — ASTHMA QUESTIONNAIRES: ACT_TOTALSCORE: 22

## 2024-01-09 ENCOUNTER — VIRTUAL VISIT (OUTPATIENT)
Dept: FAMILY MEDICINE | Facility: OTHER | Age: 54
End: 2024-01-09
Payer: COMMERCIAL

## 2024-01-09 ENCOUNTER — LAB (OUTPATIENT)
Dept: LAB | Facility: OTHER | Age: 54
End: 2024-01-09
Payer: COMMERCIAL

## 2024-01-09 DIAGNOSIS — N76.0 BACTERIAL VAGINOSIS: Primary | ICD-10-CM

## 2024-01-09 DIAGNOSIS — B96.89 BACTERIAL VAGINOSIS: Primary | ICD-10-CM

## 2024-01-09 DIAGNOSIS — R39.9 UTI SYMPTOMS: ICD-10-CM

## 2024-01-09 LAB
ALBUMIN UR-MCNC: NEGATIVE MG/DL
APPEARANCE UR: CLEAR
BILIRUB UR QL STRIP: NEGATIVE
CLUE CELLS: PRESENT
COLOR UR AUTO: YELLOW
GLUCOSE UR STRIP-MCNC: NEGATIVE MG/DL
HGB UR QL STRIP: NEGATIVE
KETONES UR STRIP-MCNC: NEGATIVE MG/DL
LEUKOCYTE ESTERASE UR QL STRIP: NEGATIVE
NITRATE UR QL: NEGATIVE
PH UR STRIP: 6.5 [PH] (ref 5–7)
SP GR UR STRIP: 1.01 (ref 1–1.03)
TRICHOMONAS, WET PREP: ABNORMAL
UROBILINOGEN UR STRIP-ACNC: 0.2 E.U./DL
WBC'S/HIGH POWER FIELD, WET PREP: ABNORMAL
YEAST, WET PREP: ABNORMAL

## 2024-01-09 PROCEDURE — 99213 OFFICE O/P EST LOW 20 MIN: CPT | Mod: 95 | Performed by: PHYSICIAN ASSISTANT

## 2024-01-09 PROCEDURE — 87210 SMEAR WET MOUNT SALINE/INK: CPT

## 2024-01-09 PROCEDURE — 81003 URINALYSIS AUTO W/O SCOPE: CPT

## 2024-01-09 RX ORDER — METRONIDAZOLE 500 MG/1
500 TABLET ORAL 2 TIMES DAILY
Qty: 14 TABLET | Refills: 0 | Status: SHIPPED | OUTPATIENT
Start: 2024-01-09 | End: 2024-01-16

## 2024-01-09 NOTE — PROGRESS NOTES
"    Instructions Relayed to Patient by Virtual Roomer:     Patient is active on [employer]:   Relayed following to patient: \"It looks like you are active on Evolit[employer], are you able to join the visit this way? If not, do you need us to send you a link now or would you like your provider to send a link via text or email when they are ready to initiate the visit?\"    Reminded patient to ensure they were logged on to virtual visit by arrival time listed. Documented in appointment notes if patient had flexibility to initiate visit sooner than arrival time. If pediatric virtual visit, ensured pediatric patient along with parent/guardian will be present for video visit.     Patient offered the website [email or web address] and/or phone number to gogamingo Help line: [phone]    Tri is a 53 year old who is being evaluated via a billable video visit.      How would you like to obtain your AVS? [email or web address]  If the video visit is dropped, the invitation should be resent by: Text to cell phone: [phone]  Will anyone else be joining your video visit? No    Assessment & Plan     UTI symptoms  Bacterial vaginosis.  She had UA and wet prep done today based on her symptoms. Her UA shows no signs of infection but her wet prep does confirm bacterial vaginosis. Suspect that this is causing the discharge changes and the lower abdominal discomfort.  Will treat with metronidazole, advised no alcohol while on medication. Follow-up if symptoms are not improving over the next 5-7 days, sooner if worse or new concerns.   - Wet preparation; Future  - UA Macroscopic with reflex to Microscopic and Culture - Lab Collect; Future      Options for treatment and follow-up care were reviewed with the patient and/or guardian. Patient and/or guardian engaged in the decision making process and verbalized understanding of the options discussed and agreed with the final plan.      Stanley Castro PA-C  [facility] ELLEXIE " MISHA Bartlett is a 53 year old, presenting for the following health issues:  Urinary Problem        1/9/2024     7:48 AM   Additional Questions   Roomed by Ryne YANEZ   Accompanied by Self       History of Present Illness       Reason for visit:  Possible UTI  Symptom onset:  1-2 weeks ago  Symptoms include:  Frequent urination, pain in lower abdomen and fatigue.  Symptom intensity:  Moderate  Symptom progression:  Staying the same  Had these symptoms before:  Yes  Has tried/received treatment for these symptoms:  Yes  Previous treatment was successful:  Yes  Prior treatment description:  Antibiotics  What makes it worse:  No  What makes it better:  Leah consumes 0 sweetened beverage(s) daily. She exercises with enough effort to increase her heart rate 6 days per week.   She is taking medications regularly.       Genitourinary - Female  Onset/Duration: Over a week ago  Description:   Painful urination (Dysuria): No           Frequency: YES  Blood in urine (Hematuria): No  Delay in urine (Hesitency): No  Intensity: moderate  Progression of Symptoms:  same  Accompanying Signs & Symptoms:  Fever/chills: No  Flank pain: No  Nausea and vomiting: YES- A little nausea  Vaginal symptoms: discharge- Clear whitish  Abdominal/Pelvic Pain: YES  History:   History of frequent UTI s: No  History of kidney stones: No  Sexually Active: No  Possibility of pregnancy: No  Precipitating or alleviating factors: Eating makes her a little nauseous  Therapies tried and outcome:  Monistat 1 dose     Started around December 30th.   Having pain in the lower abdomen. No radiation of the pain. It is right in the front and lower abdomen.   Discharge is different from her normal.   Hasn't had a menstrual cycle for 1.5 years.     Review of Systems   Constitutional, HEENT, cardiovascular, pulmonary, gi and gu systems are negative, except as otherwise noted.      Objective           Vitals:  No vitals were obtained today due to virtual  visit.    Physical Exam   GENERAL: Healthy, alert and no distress  EYES: Eyes grossly normal to inspection.  No discharge or erythema, or obvious scleral/conjunctival abnormalities.  RESP: No audible wheeze, cough, or visible cyanosis.  No visible retractions or increased work of breathing.    SKIN: Visible skin clear. No significant rash, abnormal pigmentation or lesions.  NEURO: Cranial nerves grossly intact.  Mentation and speech appropriate for age.  PSYCH: Mentation appears normal, affect normal/bright, judgement and insight intact, normal speech and appearance well-groomed.    Results for orders placed or performed in visit on 01/09/24   UA Macroscopic with reflex to Microscopic and Culture - Lab Collect     Status: Normal    Specimen: Urine, Clean Catch   Result Value Ref Range    Color Urine Yellow Colorless, Straw, Light Yellow, Yellow    Appearance Urine Clear Clear    Glucose Urine Negative Negative mg/dL    Bilirubin Urine Negative Negative    Ketones Urine Negative Negative mg/dL    Specific Gravity Urine 1.010 1.003 - 1.035    Blood Urine Negative Negative    pH Urine 6.5 5.0 - 7.0    Protein Albumin Urine Negative Negative mg/dL    Urobilinogen Urine 0.2 0.2, 1.0 E.U./dL    Nitrite Urine Negative Negative    Leukocyte Esterase Urine Negative Negative    Narrative    Microscopic not indicated   Wet preparation     Status: Abnormal    Specimen: Vagina; Swab   Result Value Ref Range    Trichomonas Absent Absent    Yeast Absent Absent    Clue Cells Present (A) Absent    WBCs/high power field 3+ (A) None           Video-Visit Details    Type of service:  Video Visit     Originating Location (pt. Location): Home    Distant Location (provider location):  Off-site  Platform used for Video Visit: Myrio Solution

## 2024-02-13 ENCOUNTER — OFFICE VISIT (OUTPATIENT)
Dept: FAMILY MEDICINE | Facility: CLINIC | Age: 54
End: 2024-02-13
Payer: COMMERCIAL

## 2024-02-13 VITALS
SYSTOLIC BLOOD PRESSURE: 147 MMHG | BODY MASS INDEX: 26.68 KG/M2 | TEMPERATURE: 98.1 F | OXYGEN SATURATION: 98 % | HEIGHT: 62 IN | HEART RATE: 73 BPM | DIASTOLIC BLOOD PRESSURE: 87 MMHG | WEIGHT: 145 LBS | RESPIRATION RATE: 16 BRPM

## 2024-02-13 DIAGNOSIS — N89.8 VAGINAL IRRITATION: Primary | ICD-10-CM

## 2024-02-13 PROCEDURE — 99213 OFFICE O/P EST LOW 20 MIN: CPT | Performed by: PHYSICIAN ASSISTANT

## 2024-02-13 NOTE — PROGRESS NOTES
"  Assessment & Plan     Vaginal irritation  Discussed that BV does not always need to be treated as it usually will wax and wanr and often resolve on its own. Discussed that we can repeat wet mount if she wishes but since symptoms have decreased without urgency or frequency, might not be needed. She is ok with waiting for now.    Recommended trying a vaginal boric acid suppository once weekly and trying HyaloGyn vaginal moisturizer to see if symptoms resolve. If not, we can further examine area or send referral to GYN for further evaluation and treatment. May benefit from topical estrogen but would want that recommendation from GYN before initiation. Pt agreeable and will let me know how this works.     Also recommended using a mirror to visualize genital tissue and make sure there are no skin changes.       BMI  Estimated body mass index is 26.52 kg/m  as calculated from the following:    Height as of this encounter: 1.575 m (5' 2\").    Weight as of this encounter: 65.8 kg (145 lb).     Marko Bartlett is a 53 year old, presenting for the following health issues:  Vaginal Problem (Per pt still having Sx since recent treatment. 1/9/2024-[facility]///)      2/13/2024     1:33 PM   Additional Questions   Roomed by Carol   Accompanied by self         2/13/2024     1:33 PM   Patient Reported Additional Medications   Patient reports taking the following new medications metronzole     Tri is a very pleasant 53 year old female who presents to clinic for follow up of BV (dx'd about 2-3 weeks ago). She was given medication and symptoms have significantly lessened but not completely resolved. She has been having itching  and vaginal dryness and wonders if symptoms could be due to menopause. LMP was 1.5 years ago. She is not currently sexually active. She no longer has urgency or frequency. She has no vaginal discharge. No other symptoms to report.     History of Present Illness       Reason for " "visit:  Flup on bacterial vaginosis    She eats 4 or more servings of fruits and vegetables daily.She consumes 0 sweetened beverage(s) daily.She exercises with enough effort to increase her heart rate 30 to 60 minutes per day.  She exercises with enough effort to increase her heart rate 6 days per week.   She is taking medications regularly.     ROS: as per HPI        Objective    BP (!) 147/87   Pulse 73   Temp 98.1  F (36.7  C) (Tympanic)   Resp 16   Ht 1.575 m (5' 2\")   Wt 65.8 kg (145 lb)   LMP  (LMP Unknown)   SpO2 98%   BMI 26.52 kg/m    Body mass index is 26.52 kg/m .  Physical Exam  Vitals reviewed.   Constitutional:       Appearance: Normal appearance. She is not ill-appearing.   Pulmonary:      Comments: Even and unlabored breathing  Neurological:      Mental Status: She is alert.   Psychiatric:         Attention and Perception: Attention normal.         Mood and Affect: Mood normal.         Speech: Speech normal.         Behavior: Behavior normal.         Thought Content: Thought content normal.         Cognition and Memory: Cognition normal.         Judgment: Judgment normal.         Signed Electronically by: MAYLIN MAXWELL PA-C    "

## 2024-06-04 ENCOUNTER — OFFICE VISIT (OUTPATIENT)
Dept: FAMILY MEDICINE | Facility: OTHER | Age: 54
End: 2024-06-04
Payer: COMMERCIAL

## 2024-06-04 VITALS
SYSTOLIC BLOOD PRESSURE: 118 MMHG | HEIGHT: 62 IN | WEIGHT: 143 LBS | HEART RATE: 77 BPM | BODY MASS INDEX: 26.31 KG/M2 | DIASTOLIC BLOOD PRESSURE: 80 MMHG | TEMPERATURE: 97.2 F | RESPIRATION RATE: 18 BRPM | OXYGEN SATURATION: 98 %

## 2024-06-04 DIAGNOSIS — Z00.00 ROUTINE GENERAL MEDICAL EXAMINATION AT A HEALTH CARE FACILITY: Primary | ICD-10-CM

## 2024-06-04 DIAGNOSIS — E78.5 HYPERLIPIDEMIA LDL GOAL <100: ICD-10-CM

## 2024-06-04 DIAGNOSIS — J45.20 INTERMITTENT ASTHMA, UNCOMPLICATED: ICD-10-CM

## 2024-06-04 DIAGNOSIS — Z13.1 SCREENING FOR DIABETES MELLITUS: ICD-10-CM

## 2024-06-04 LAB
CHOLEST SERPL-MCNC: 234 MG/DL
FASTING STATUS PATIENT QL REPORTED: YES
FASTING STATUS PATIENT QL REPORTED: YES
GLUCOSE SERPL-MCNC: 107 MG/DL (ref 70–99)
HDLC SERPL-MCNC: 59 MG/DL
LDLC SERPL CALC-MCNC: 156 MG/DL
NONHDLC SERPL-MCNC: 175 MG/DL
TRIGL SERPL-MCNC: 95 MG/DL

## 2024-06-04 PROCEDURE — 36415 COLL VENOUS BLD VENIPUNCTURE: CPT | Performed by: PHYSICIAN ASSISTANT

## 2024-06-04 PROCEDURE — 80061 LIPID PANEL: CPT | Performed by: PHYSICIAN ASSISTANT

## 2024-06-04 PROCEDURE — 82947 ASSAY GLUCOSE BLOOD QUANT: CPT | Performed by: PHYSICIAN ASSISTANT

## 2024-06-04 PROCEDURE — 99396 PREV VISIT EST AGE 40-64: CPT | Performed by: PHYSICIAN ASSISTANT

## 2024-06-04 RX ORDER — ALBUTEROL SULFATE 90 UG/1
2 AEROSOL, METERED RESPIRATORY (INHALATION) EVERY 6 HOURS
Qty: 18 G | Refills: 11 | Status: SHIPPED | OUTPATIENT
Start: 2024-06-04

## 2024-06-04 SDOH — HEALTH STABILITY: PHYSICAL HEALTH: ON AVERAGE, HOW MANY DAYS PER WEEK DO YOU ENGAGE IN MODERATE TO STRENUOUS EXERCISE (LIKE A BRISK WALK)?: 6 DAYS

## 2024-06-04 ASSESSMENT — SOCIAL DETERMINANTS OF HEALTH (SDOH): HOW OFTEN DO YOU GET TOGETHER WITH FRIENDS OR RELATIVES?: ONCE A WEEK

## 2024-06-04 ASSESSMENT — ASTHMA QUESTIONNAIRES
QUESTION_4 LAST FOUR WEEKS HOW OFTEN HAVE YOU USED YOUR RESCUE INHALER OR NEBULIZER MEDICATION (SUCH AS ALBUTEROL): ONE OR TWO TIMES PER DAY
QUESTION_3 LAST FOUR WEEKS HOW OFTEN DID YOUR ASTHMA SYMPTOMS (WHEEZING, COUGHING, SHORTNESS OF BREATH, CHEST TIGHTNESS OR PAIN) WAKE YOU UP AT NIGHT OR EARLIER THAN USUAL IN THE MORNING: NOT AT ALL
QUESTION_2 LAST FOUR WEEKS HOW OFTEN HAVE YOU HAD SHORTNESS OF BREATH: NOT AT ALL
ACT_TOTALSCORE: 22
ACT_TOTALSCORE: 22
QUESTION_1 LAST FOUR WEEKS HOW MUCH OF THE TIME DID YOUR ASTHMA KEEP YOU FROM GETTING AS MUCH DONE AT WORK, SCHOOL OR AT HOME: NONE OF THE TIME
QUESTION_5 LAST FOUR WEEKS HOW WOULD YOU RATE YOUR ASTHMA CONTROL: COMPLETELY CONTROLLED

## 2024-06-04 ASSESSMENT — PAIN SCALES - GENERAL: PAINLEVEL: NO PAIN (0)

## 2024-06-04 NOTE — LETTER
My Asthma Action Plan    Name: Tri Mejia   YOB: 1970  Date: 6/4/2024   My doctor: Renetta Pierce PA-C   My clinic: [facility]        My Rescue Medicine:   Albuterol inhaler (Proair/Ventolin/Proventil HFA)  2-4 puffs EVERY 4 HOURS as needed. Use a spacer if recommended by your provider.   My Asthma Severity:   Intermittent / Exercise Induced  Know your asthma triggers: exercise or sports             GREEN ZONE   Good Control  I feel good  No cough or wheeze  Can work, sleep and play without asthma symptoms       Take your asthma control medicine every day.     If exercise triggers your asthma, take your rescue medication  15 minutes before exercise or sports, and  During exercise if you have asthma symptoms  Spacer to use with inhaler: If you have a spacer, make sure to use it with your inhaler             YELLOW ZONE Getting Worse  I have ANY of these:  I do not feel good  Cough or wheeze  Chest feels tight  Wake up at night   Keep taking your Green Zone medications  Start taking your rescue medicine:  every 20 minutes for up to 1 hour. Then every 4 hours for 24-48 hours.  If you stay in the Yellow Zone for more than 12-24 hours, contact your doctor.  If you do not return to the Green Zone in 12-24 hours or you get worse, start taking your oral steroid medicine if prescribed by your provider.           RED ZONE Medical Alert - Get Help  I have ANY of these:  I feel awful  Medicine is not helping  Breathing getting harder  Trouble walking or talking  Nose opens wide to breathe       Take your rescue medicine NOW  If your provider has prescribed an oral steroid medicine, start taking it NOW  Call your doctor NOW  If you are still in the Red Zone after 20 minutes and you have not reached your doctor:  Take your rescue medicine again and  Call 911 or go to the emergency room right away    See your regular doctor within 2 weeks of an Emergency Room or  Urgent Care visit for follow-up treatment.          Annual Reminders:  Meet with Asthma Educator,  Flu Shot in the Fall, consider Pneumonia Vaccination for patients with asthma (aged 19 and older).    Pharmacy:    [id number] - [place] - [address] [facility] [place] [address] - [place], [place] - [address]    Electronically signed by Renetta Pierce PA-C   Date: 06/04/24                    Asthma Triggers  How To Control Things That Make Your Asthma Worse    Triggers are things that make your asthma worse.  Look at the list below to help you find your triggers and   what you can do about them. You can help prevent asthma flare-ups by staying away from your triggers.      Trigger                                                          What you can do   Cigarette Smoke  Tobacco smoke can make asthma worse. Do not allow smoking in your home, car or around you.  Be sure no one smokes at a child s day care or school.  If you smoke, ask your health care provider for ways to help you quit.  Ask family members to quit too.  Ask your health care provider for a referral to Quit Plan to help you quit smoking, or call 7-927-687-PLAN.     Colds, Flu, Bronchitis  These are common triggers of asthma. Wash your hands often.  Don t touch your eyes, nose or mouth.  Get a flu shot every year.     Dust Mites  These are tiny bugs that live in cloth or carpet. They are too small to see. Wash sheets and blankets in hot water every week.   Encase pillows and mattress in dust mite proof covers.  Avoid having carpet if you can. If you have carpet, vacuum weekly.   Use a dust mask and HEPA vacuum.   Pollen and Outdoor Mold  Some people are allergic to trees, grass, or weed pollen, or molds. Try to keep your windows closed.  Limit time out doors when pollen count is high.   Ask you health care provider about taking medicine during allergy season.     Animal  Dander  Some people are allergic to skin flakes, urine or saliva from pets with fur or feathers. Keep pets with fur or feathers out of your home.    If you can t keep the pet outdoors, then keep the pet out of your bedroom.  Keep the bedroom door closed.  Keep pets off cloth furniture and away from stuffed toys.     Mice, Rats, and Cockroaches  Some people are allergic to the waste from these pests.   Cover food and garbage.  Clean up spills and food crumbs.  Store grease in the refrigerator.   Keep food out of the bedroom.   Indoor Mold  This can be a trigger if your home has high moisture. Fix leaking faucets, pipes, or other sources of water.   Clean moldy surfaces.  Dehumidify basement if it is damp and smelly.   Smoke, Strong Odors, and Sprays  These can reduce air quality. Stay away from strong odors and sprays, such as perfume, powder, hair spray, paints, smoke incense, paint, cleaning products, candles and new carpet.   Exercise or Sports  Some people with asthma have this trigger. Be active!  Ask your doctor about taking medicine before sports or exercise to prevent symptoms.    Warm up for 5-10 minutes before and after sports or exercise.     Other Triggers of Asthma  Cold air:  Cover your nose and mouth with a scarf.  Sometimes laughing or crying can be a trigger.  Some medicines and food can trigger asthma.

## 2024-06-04 NOTE — RESULT ENCOUNTER NOTE
Gianfranco Bartlett    Your results show improved cholesterol from 1 year ago but still borderline. There is also a slight increase in glucose. We will recheck these again next year.     The results are attached for your review.       Sammy Pierce PA-C

## 2024-06-04 NOTE — PATIENT INSTRUCTIONS
"Preventive Care Advice   This is general advice we often give to help people stay healthy. Your care team may have specific advice just for you. Please talk to your care team about your own preventive care needs.  Lifestyle  Exercise at least 150 minutes each week (30 minutes a day, 5 days a week).  Do muscle strengthening activities 2 days a week. These help control your weight and prevent disease.  No smoking.  Wear sunscreen to prevent skin cancer.  Have your home tested for radon every 2 to 5 years. Radon is a colorless, odorless gas that can harm your lungs. To learn more, go to www.health.[facility].[place]. and search for \"Radon in Homes.\"  Keep guns unloaded and locked up in a safe place like a safe or gun vault, or, use a gun lock and hide the keys. Always lock away bullets separately. To learn more, visit [employer].[place].gov and search for \"safe gun storage.\"  Nutrition  Eat 5 or more servings of fruits and vegetables each day.  Try wheat bread, brown rice and whole grain pasta (instead of white bread, rice, and pasta).  Get enough calcium and vitamin D. Check the label on foods and aim for 100% of the RDA (recommended daily allowance).  Regular exams  Have a dental exam and cleaning every 6 months.  See your health care team every year to talk about:  Any changes in your health.  Any medicines your care team has prescribed.  Preventive care, family planning, and ways to prevent chronic diseases.  Shots (vaccines)   HPV shots (up to age 26), if you've never had them before.  Hepatitis B shots (up to age 59), if you've never had them before.  COVID-19 shot: Get this shot when it's due.  Flu shot: Get a flu shot every year.  Tetanus shot: Get a tetanus shot every 10 years.  Pneumococcal, hepatitis A, and RSV shots: Ask your care team if you need these based on your risk.  Shingles shot (for age 50 and up).  General health tests  Diabetes screening:  Starting at age 35, Get screened for diabetes at least every 3 years.  If " you are younger than age 35, ask your care team if you should be screened for diabetes.  Cholesterol test: At age 39, start having a cholesterol test every 5 years, or more often if advised.  Bone density scan (DEXA): At age 50, ask your care team if you should have this scan for osteoporosis (brittle bones).  Hepatitis C: Get tested at least once in your life.  Abdominal aortic aneurysm screening: Talk to your doctor about having this screening if you:  Have ever smoked; and  Are biologically male; and  Are between the ages of 65 and 75.  STIs (sexually transmitted infections)  Before age 24: Ask your care team if you should be screened for STIs.  After age 24: Get screened for STIs if you're at risk. You are at risk for STIs (including HIV) if:  You are sexually active with more than one person.  You don't use condoms every time.  You or a partner was diagnosed with a sexually transmitted infection.  If you are at risk for HIV, ask about PrEP medicine to prevent HIV.  Get tested for HIV at least once in your life, whether you are at risk for HIV or not.  Cancer screening tests  Cervical cancer screening: If you have a cervix, begin getting regular cervical cancer screening tests at age 21. Most people who have regular screenings with normal results can stop after age 65. Talk about this with your provider.  Breast cancer scan (mammogram): If you've ever had breasts, begin having regular mammograms starting at age 40. This is a scan to check for breast cancer.  Colon cancer screening: It is important to start screening for colon cancer at age 45.  Have a colonoscopy test every 10 years (or more often if you're at risk) Or, ask your provider about stool tests like a FIT test every year or Cologuard test every 3 years.  To learn more about your testing options, visit: www.[employer]/600539.pdf.  For help making a decision, visit: carloz/dq16596.  Prostate cancer screening test: If you have a prostate and are age 55  to 69, ask your provider if you would benefit from a yearly prostate cancer screening test.  Lung cancer screening: If you are a current or former smoker age 50 to 80, ask your care team if ongoing lung cancer screenings are right for you.  For informational purposes only. Not to replace the advice of your health care provider. Copyright   2023 [employer]. All rights reserved. Clinically reviewed by the [facility]. Ongage [id number] - REV 04/24.    Learning About Stress  What is stress?     Stress is your body's response to a hard situation. Your body can have a physical, emotional, or mental response. Stress is a fact of life for most people, and it affects everyone differently. What causes stress for you may not be stressful for someone else.  A lot of things can cause stress. You may feel stress when you go on a job interview, take a test, or run a race. This kind of short-term stress is normal and even useful. It can help you if you need to work hard or react quickly. For example, stress can help you finish an important job on time.  Long-term stress is caused by ongoing stressful situations or events. Examples of long-term stress include long-term health problems, ongoing problems at work, or conflicts in your family. Long-term stress can harm your health.  How does stress affect your health?  When you are stressed, your body responds as though you are in danger. It makes hormones that speed up your heart, make you breathe faster, and give you a burst of energy. This is called the fight-or-flight stress response. If the stress is over quickly, your body goes back to normal and no harm is done.  But if stress happens too often or lasts too long, it can have bad effects. Long-term stress can make you more likely to get sick, and it can make symptoms of some diseases worse. If you tense up when you are stressed, you may develop neck, shoulder, or low back pain. Stress is  linked to high blood pressure and heart disease.  Stress also harms your emotional health. It can make you good, tense, or depressed. Your relationships may suffer, and you may not do well at work or school.  What can you do to manage stress?  You can try these things to help manage stress:   Do something active. Exercise or activity can help reduce stress. Walking is a great way to get started. Even everyday activities such as housecleaning or yard work can help.  Try yoga or catracho chi. These techniques combine exercise and meditation. You may need some training at first to learn them.  Do something you enjoy. For example, listen to music or go to a movie. Practice your hobby or do volunteer work.  Meditate. This can help you relax, because you are not worrying about what happened before or what may happen in the future.  Do guided imagery. Imagine yourself in any setting that helps you feel calm. You can use online videos, books, or a teacher to guide you.  Do breathing exercises. For example:  From a standing position, bend forward from the waist with your knees slightly bent. Let your arms dangle close to the floor.  Breathe in slowly and deeply as you return to a standing position. Roll up slowly and lift your head last.  Hold your breath for just a few seconds in the standing position.  Breathe out slowly and bend forward from the waist.  Let your feelings out. Talk, laugh, cry, and express anger when you need to. Talking with supportive friends or family, a counselor, or a oscar leader about your feelings is a healthy way to relieve stress. Avoid discussing your feelings with people who make you feel worse.  Write. It may help to write about things that are bothering you. This helps you find out how much stress you feel and what is causing it. When you know this, you can find better ways to cope.  What can you do to prevent stress?  You might try some of these things to help prevent stress:  Manage your time.  "This helps you find time to do the things you want and need to do.  Get enough sleep. Your body recovers from the stresses of the day while you are sleeping.  Get support. Your family, friends, and community can make a difference in how you experience stress.  Limit your news feed. Avoid or limit time on social media or news that may make you feel stressed.  Do something active. Exercise or activity can help reduce stress. Walking is a great way to get started.  Where can you learn more?  Go to https://www.[employer].net/patiented  Enter N032 in the search box to learn more about \"Learning About Stress.\"  Current as of: October 24, 2023               Content Version: 14.0    7474-6736 "[employer].   Care instructions adapted under license by your healthcare professional. If you have questions about a medical condition or this instruction, always ask your healthcare professional. "[employer] disclaims any warranty or liability for your use of this information.      "

## 2024-06-04 NOTE — PROGRESS NOTES
"Preventive Care Visit  [facility]  Renetta LEYDI Hugo-NATALY Hummel, Family Medicine  Jun 4, 2024    Assessment & Plan     ICD-10-CM    1. Routine general medical examination at a health care facility  Z00.00       2. Hyperlipidemia LDL goal <100  E78.5 Lipid panel reflex to direct LDL Fasting     Lipid panel reflex to direct LDL Fasting      3. Screening for diabetes mellitus  Z13.1 Glucose     Glucose      4. Intermittent asthma, uncomplicated  J45.20 albuterol (PROAIR HFA/PROVENTIL HFA/VENTOLIN HFA) 108 (90 Base) MCG/ACT inhaler        Patient has been advised of split billing requirements and indicates understanding: Yes        BMI  Estimated body mass index is 25.98 kg/m  as calculated from the following:    Height as of this encounter: 1.58 m (5' 2.21\").    Weight as of this encounter: 64.9 kg (143 lb).       Counseling  Appropriate preventive services were discussed with this patient, including applicable screening as appropriate for fall prevention, nutrition, physical activity, Tobacco-use cessation, weight loss and cognition.  Checklist reviewing preventive services available has been given to the patient.  Reviewed patient's diet, addressing concerns and/or questions.   She is at risk for psychosocial distress and has been provided with information to reduce risk.     - Mild elevation in the past of lipid, not on medication, recommend yearly rechecks   - Will get fasting glucose as well  - Results will be communicated to patient when available and appropriate medication changes made if necessary     - Asthma     Stable, only issues when exercises, rare use of albuterol     Reviewed medication use and side effects, refilled       Review of the result(s) of each unique test - See list          6/1/23 - all labs & pap       10/19/23 - mammogram         11/9/20 - colonoscopy   Diagnosis or treatment significantly limited by social determinants of health - None     20 minutes spent " on the date of the encounter doing chart review, history and exam, documentation and further activities as noted above    The patient indicates understanding of these issues and agrees with the plan.    Follow up: 1 year or PRN     Sammy Hugo-NATALY Hummel  [facility] - [place] Bartlett is a 54 year old, presenting for the following:  Physical        6/4/2024     7:00 AM   Additional Questions   Roomed by Yolanda MARTIN   Accompanied by Self        Health Care Directive  Patient does not have a Health Care Directive or Living Will: Discussed advance care planning with patient; information given to patient to review.    HPI    Mole on left side of face           6/4/2024   General Health   How would you rate your overall physical health? Good   Feel stress (tense, anxious, or unable to sleep) Only a little   (!) STRESS CONCERN      6/4/2024   Nutrition   Three or more servings of calcium each day? Yes   Diet: Regular (no restrictions)   How many servings of fruit and vegetables per day? (!) 2-3   How many sweetened beverages each day? 0-1         6/4/2024   Exercise   Days per week of moderate/strenous exercise 6 days         6/4/2024   Social Factors   Frequency of gathering with friends or relatives Once a week   Worry food won't last until get money to buy more No   Food not last or not have enough money for food? No   Do you have housing?  Yes   Are you worried about losing your housing? No   Lack of transportation? No   Unable to get utilities (heat,electricity)? No         6/4/2024   Fall Risk   Fallen 2 or more times in the past year? No   Trouble with walking or balance? No          6/4/2024   Dental   Dentist two times every year? Yes         6/4/2024   TB Screening   Were you born outside of the [place]? No         Today's PHQ-2 Score:       1/8/2024     3:12 PM   PHQ-2 ( 1999 [employer])   Q1: Little interest or pleasure in doing things 0   Q2: Feeling down, depressed or hopeless 0    PHQ-2 Score 0   Q1: Little interest or pleasure in doing things Not at all   Q2: Feeling down, depressed or hopeless Not at all   PHQ-2 Score 0         2024   Substance Use   Alcohol more than 3/day or more than 7/wk No   Do you use any other substances recreationally? No     Social History     Tobacco Use    Smoking status: Former     Current packs/day: 0.00     Average packs/day: 0.5 packs/day for 5.0 years (2.5 ttl pk-yrs)     Types: Cigarettes     Start date: 2000     Quit date: 2005     Years since quittin.1    Smokeless tobacco: Never    Tobacco comments:     smoked for intermittently, no longer do.   Vaping Use    Vaping status: Never Used   Substance Use Topics    Alcohol use: Yes     Comment:  a couple of beers weeklly    Drug use: No         10/19/2023   LAST FHS-7 RESULTS   1st degree relative breast or ovarian cancer No   Any relative bilateral breast cancer No   Any male have breast cancer No   Any ONE woman have BOTH breast AND ovarian cancer No   Any woman with breast cancer before 50yrs No   2 or more relatives with breast AND/OR ovarian cancer No   2 or more relatives with breast AND/OR bowel cancer No      Mammogram Screening - Mammogram every 1-2 years updated in Health Maintenance based on mutual decision making        2024   STI Screening   New sexual partner(s) since last STI/HIV test? No     History of abnormal Pap smear: No - age 30-64 HPV with reflex Pap every 5 years recommended        Latest Ref Rng & Units 2023     8:59 AM 10/9/2018    10:10 AM 10/9/2018    10:00 AM   PAP / HPV   PAP  Negative for Intraepithelial Lesion or Malignancy (NILM)      PAP (Historical)   NIL     HPV 16 DNA Negative Negative   Negative    HPV 18 DNA Negative Negative   Negative    Other HR HPV Negative Negative   Negative      ASCVD Risk   The 10-year ASCVD risk score (Clover SIGALA, et al., 2019) is: 1.8%    Values used to calculate the score:      Age: 54 years      Sex:  Female      Is Non- : No      Diabetic: No      Tobacco smoker: No      Systolic Blood Pressure: 118 mmHg      Is BP treated: No      HDL Cholesterol: 60 mg/dL      Total Cholesterol: 250 mg/dL        Reviewed and updated as needed this visit by Provider   Tobacco  Allergies  Meds  Problems  Med Hx  Surg Hx  Fam Hx            Past Medical History:   Diagnosis Date    Allergic rhinitis, cause unspecified     Seasonal    Depressive disorder 1995    Don't currently have.    Depressive disorder, not elsewhere classified     PVC (premature ventricular contraction)     RECTAL & ANAL HEMORRHAGE 05/10/2007    Uncomplicated asthma 1993    Unspecified asthma(493.90) 01/01/1995     Past Surgical History:   Procedure Laterality Date    COLONOSCOPY  05/21/2007    Sm internal hemorrhoids, otherwise normal. Repeat age 50.    COLONOSCOPY N/A 11/09/2020    Procedure: COLONOSCOPY WITH POLYPECTOMY;  Surgeon: Melchor Wilkins MD;  Location:  GI    COSMETIC SURGERY      liposuction    ESOPHAGOSCOPY, GASTROSCOPY, DUODENOSCOPY (EGD), COMBINED  10/03/2011    Procedure:COMBINED ESOPHAGOSCOPY, GASTROSCOPY, DUODENOSCOPY (EGD), BIOPSY SINGLE OR MULTIPLE; esophagogastroduodenoscopy with biopsy and dilatation, MULTIPLE BX; Surgeon:CHARY MORAN; Location: GI    ESOPHAGOSCOPY, GASTROSCOPY, DUODENOSCOPY (EGD), DILATATION, COMBINED  10/03/2011    Procedure:COMBINED ESOPHAGOSCOPY, GASTROSCOPY, DUODENOSCOPY (EGD), DILATATION; Surgeon:CHARY MORAN; Location: GI    HC HYSTEROSCOPY W ENDOMETRIAL BX/POLYPECTOMY W/WO D&C  01/19/2007    Hysteroscopy, D&C    HC REMOVAL OF TONSILS,<13 Y/O  01/01/1976    Tonsils <12y.o.     Lab work is in process  Labs reviewed in EPIC  BP Readings from Last 3 Encounters:   06/04/24 118/80   02/13/24 (!) 147/87   06/01/23 110/76    Wt Readings from Last 3 Encounters:   06/04/24 64.9 kg (143 lb)   02/13/24 65.8 kg (145 lb)   06/01/23 65.3 kg (144 lb)                 "      Review of Systems  Constitutional, neuro, ENT, endocrine, pulmonary, cardiac, gastrointestinal, genitourinary, musculoskeletal, integument and psychiatric systems are negative, except as otherwise noted.     Objective    Exam  /80   Pulse 77   Temp 97.2  F (36.2  C) (Temporal)   Resp 18   Ht 1.58 m (5' 2.21\")   Wt 64.9 kg (143 lb)   SpO2 98%   BMI 25.98 kg/m     Estimated body mass index is 25.98 kg/m  as calculated from the following:    Height as of this encounter: 1.58 m (5' 2.21\").    Weight as of this encounter: 64.9 kg (143 lb).    Physical Exam  Constitutional:       General: She is not in acute distress.     Appearance: She is well-developed.   HENT:      Right Ear: External ear normal. No middle ear effusion. There is no impacted cerumen. Tympanic membrane is not injected, perforated, erythematous or bulging.      Left Ear: External ear normal.  No middle ear effusion. There is no impacted cerumen. Tympanic membrane is not injected, perforated, erythematous or bulging.      Nose: Nose normal. No mucosal edema or rhinorrhea.      Mouth/Throat:      Dentition: Normal dentition.      Tongue: No lesions. Tongue does not deviate from midline.      Palate: No lesions.      Pharynx: No pharyngeal swelling, oropharyngeal exudate or posterior oropharyngeal erythema.      Tonsils: No tonsillar exudate or tonsillar abscesses.   Eyes:      General: Lids are normal.         Right eye: No discharge.         Left eye: No discharge.      Conjunctiva/sclera: Conjunctivae normal.      Right eye: Right conjunctiva is not injected.      Left eye: Left conjunctiva is not injected.      Pupils: Pupils are equal, round, and reactive to light.   Neck:      Thyroid: No thyroid mass or thyromegaly.      Vascular: No JVD.      Trachea: Trachea normal. No tracheal deviation.   Cardiovascular:      Rate and Rhythm: Normal rate and regular rhythm.      Heart sounds: Normal heart sounds. No murmur heard.     No " friction rub. No gallop.   Pulmonary:      Effort: Pulmonary effort is normal. No respiratory distress.      Breath sounds: Normal breath sounds. No stridor or decreased air movement. No wheezing, rhonchi or rales.   Abdominal:      General: Bowel sounds are normal. There is no distension.      Palpations: Abdomen is soft. There is no mass.      Tenderness: There is no abdominal tenderness. There is no guarding or rebound.      Hernia: No hernia is present.   Musculoskeletal:         General: Normal range of motion.      Cervical back: Normal range of motion and neck supple.   Lymphadenopathy:      Cervical: No cervical adenopathy.   Skin:     General: Skin is warm and dry.          Neurological:      Mental Status: She is alert and oriented to person, place, and time.   Psychiatric:         Behavior: Behavior normal.         Thought Content: Thought content normal.         Judgment: Judgment normal.     Breast declined   Pelvic not indicated       Diagnostics: reviewed in Epic, see orders pending in Epic       Signed Electronically by: Renetta Pierce PA-C

## 2024-09-19 ENCOUNTER — PATIENT OUTREACH (OUTPATIENT)
Dept: CARE COORDINATION | Facility: CLINIC | Age: 54
End: 2024-09-19
Payer: COMMERCIAL

## 2024-10-25 ENCOUNTER — HOSPITAL ENCOUNTER (OUTPATIENT)
Dept: MAMMOGRAPHY | Facility: CLINIC | Age: 54
Discharge: HOME | End: 2024-10-25
Attending: PHYSICIAN ASSISTANT | Admitting: PHYSICIAN ASSISTANT
Payer: COMMERCIAL

## 2024-10-25 DIAGNOSIS — Z12.31 VISIT FOR SCREENING MAMMOGRAM: ICD-10-CM

## 2024-10-25 PROCEDURE — 77063 BREAST TOMOSYNTHESIS BI: CPT

## 2025-05-05 ENCOUNTER — PATIENT OUTREACH (OUTPATIENT)
Dept: CARE COORDINATION | Facility: CLINIC | Age: 55
End: 2025-05-05
Payer: COMMERCIAL

## 2025-07-15 ENCOUNTER — OFFICE VISIT (OUTPATIENT)
Dept: FAMILY MEDICINE | Facility: OTHER | Age: 55
End: 2025-07-15
Payer: COMMERCIAL

## 2025-07-15 VITALS
DIASTOLIC BLOOD PRESSURE: 82 MMHG | HEIGHT: 62 IN | SYSTOLIC BLOOD PRESSURE: 128 MMHG | WEIGHT: 146 LBS | BODY MASS INDEX: 26.87 KG/M2 | HEART RATE: 66 BPM | TEMPERATURE: 98.3 F | RESPIRATION RATE: 18 BRPM | OXYGEN SATURATION: 96 %

## 2025-07-15 DIAGNOSIS — Z13.220 SCREENING FOR LIPID DISORDERS: ICD-10-CM

## 2025-07-15 DIAGNOSIS — Z23 NEED FOR SHINGLES VACCINE: ICD-10-CM

## 2025-07-15 DIAGNOSIS — Z23 NEED FOR PNEUMOCOCCAL 20-VALENT CONJUGATE VACCINATION: ICD-10-CM

## 2025-07-15 DIAGNOSIS — R73.09 ELEVATED GLUCOSE: ICD-10-CM

## 2025-07-15 DIAGNOSIS — Z00.00 ROUTINE GENERAL MEDICAL EXAMINATION AT A HEALTH CARE FACILITY: Primary | ICD-10-CM

## 2025-07-15 DIAGNOSIS — Z13.1 SCREENING FOR DIABETES MELLITUS: ICD-10-CM

## 2025-07-15 DIAGNOSIS — E78.5 HYPERLIPIDEMIA LDL GOAL <100: ICD-10-CM

## 2025-07-15 DIAGNOSIS — J45.20 INTERMITTENT ASTHMA, UNCOMPLICATED: ICD-10-CM

## 2025-07-15 LAB
CHOLEST SERPL-MCNC: 235 MG/DL
FASTING STATUS PATIENT QL REPORTED: YES
FASTING STATUS PATIENT QL REPORTED: YES
GLUCOSE SERPL-MCNC: 113 MG/DL (ref 70–99)
HDLC SERPL-MCNC: 67 MG/DL
LDLC SERPL CALC-MCNC: 146 MG/DL
NONHDLC SERPL-MCNC: 168 MG/DL
TRIGL SERPL-MCNC: 110 MG/DL

## 2025-07-15 PROCEDURE — G2211 COMPLEX E/M VISIT ADD ON: HCPCS | Performed by: PHYSICIAN ASSISTANT

## 2025-07-15 PROCEDURE — 90677 PCV20 VACCINE IM: CPT | Performed by: PHYSICIAN ASSISTANT

## 2025-07-15 PROCEDURE — 82947 ASSAY GLUCOSE BLOOD QUANT: CPT | Performed by: PHYSICIAN ASSISTANT

## 2025-07-15 PROCEDURE — 3079F DIAST BP 80-89 MM HG: CPT | Performed by: PHYSICIAN ASSISTANT

## 2025-07-15 PROCEDURE — 90472 IMMUNIZATION ADMIN EACH ADD: CPT | Performed by: PHYSICIAN ASSISTANT

## 2025-07-15 PROCEDURE — 90750 HZV VACC RECOMBINANT IM: CPT | Performed by: PHYSICIAN ASSISTANT

## 2025-07-15 PROCEDURE — 99214 OFFICE O/P EST MOD 30 MIN: CPT | Mod: 25 | Performed by: PHYSICIAN ASSISTANT

## 2025-07-15 PROCEDURE — 3074F SYST BP LT 130 MM HG: CPT | Performed by: PHYSICIAN ASSISTANT

## 2025-07-15 PROCEDURE — 36415 COLL VENOUS BLD VENIPUNCTURE: CPT | Performed by: PHYSICIAN ASSISTANT

## 2025-07-15 PROCEDURE — 1126F AMNT PAIN NOTED NONE PRSNT: CPT | Performed by: PHYSICIAN ASSISTANT

## 2025-07-15 PROCEDURE — 80061 LIPID PANEL: CPT | Performed by: PHYSICIAN ASSISTANT

## 2025-07-15 PROCEDURE — 99396 PREV VISIT EST AGE 40-64: CPT | Mod: 25 | Performed by: PHYSICIAN ASSISTANT

## 2025-07-15 PROCEDURE — 90471 IMMUNIZATION ADMIN: CPT | Performed by: PHYSICIAN ASSISTANT

## 2025-07-15 RX ORDER — ALBUTEROL SULFATE 90 UG/1
2 INHALANT RESPIRATORY (INHALATION) EVERY 6 HOURS
Qty: 18 G | Refills: 11 | Status: SHIPPED | OUTPATIENT
Start: 2025-07-15

## 2025-07-15 SDOH — HEALTH STABILITY: PHYSICAL HEALTH: ON AVERAGE, HOW MANY MINUTES DO YOU ENGAGE IN EXERCISE AT THIS LEVEL?: 30 MIN

## 2025-07-15 SDOH — HEALTH STABILITY: PHYSICAL HEALTH: ON AVERAGE, HOW MANY DAYS PER WEEK DO YOU ENGAGE IN MODERATE TO STRENUOUS EXERCISE (LIKE A BRISK WALK)?: 5 DAYS

## 2025-07-15 ASSESSMENT — ASTHMA QUESTIONNAIRES
ACT_TOTALSCORE: 21
QUESTION_3 LAST FOUR WEEKS HOW OFTEN DID YOUR ASTHMA SYMPTOMS (WHEEZING, COUGHING, SHORTNESS OF BREATH, CHEST TIGHTNESS OR PAIN) WAKE YOU UP AT NIGHT OR EARLIER THAN USUAL IN THE MORNING: NOT AT ALL
QUESTION_5 LAST FOUR WEEKS HOW WOULD YOU RATE YOUR ASTHMA CONTROL: COMPLETELY CONTROLLED
QUESTION_4 LAST FOUR WEEKS HOW OFTEN HAVE YOU USED YOUR RESCUE INHALER OR NEBULIZER MEDICATION (SUCH AS ALBUTEROL): ONE OR TWO TIMES PER DAY
QUESTION_2 LAST FOUR WEEKS HOW OFTEN HAVE YOU HAD SHORTNESS OF BREATH: ONCE OR TWICE A WEEK
QUESTION_1 LAST FOUR WEEKS HOW MUCH OF THE TIME DID YOUR ASTHMA KEEP YOU FROM GETTING AS MUCH DONE AT WORK, SCHOOL OR AT HOME: NONE OF THE TIME

## 2025-07-15 ASSESSMENT — PAIN SCALES - GENERAL: PAINLEVEL_OUTOF10: NO PAIN (0)

## 2025-07-15 ASSESSMENT — SOCIAL DETERMINANTS OF HEALTH (SDOH): HOW OFTEN DO YOU GET TOGETHER WITH FRIENDS OR RELATIVES?: ONCE A WEEK

## 2025-07-15 NOTE — PROGRESS NOTES
Prior to immunization administration, verified patients identity using patient s name and date of birth. Please see Immunization Activity for additional information.     Screening Questionnaire for Adult Immunization    Are you sick today?   No   Do you have allergies to medications, food, a vaccine component or latex?   Yes: Bactrim    Have you ever had a serious reaction after receiving a vaccination?   No   Do you have a long-term health problem with heart, lung, kidney, or metabolic disease (e.g., diabetes), asthma, a blood disorder, no spleen, complement component deficiency, a cochlear implant, or a spinal fluid leak?  Are you on long-term aspirin therapy?   Yes: Asthma   Do you have cancer, leukemia, HIV/AIDS, or any other immune system problem?   No   Do you have a parent, brother, or sister with an immune system problem?   No   In the past 3 months, have you taken medications that affect  your immune system, such as prednisone, other steroids, or anticancer drugs; drugs for the treatment of rheumatoid arthritis, Crohn s disease, or psoriasis; or have you had radiation treatments?   No   Have you had a seizure, or a brain or other nervous system problem?   No   During the past year, have you received a transfusion of blood or blood    products, or been given immune (gamma) globulin or antiviral drug?   No   For women: Are you pregnant or is there a chance you could become       pregnant during the next month?   No   Have you received any vaccinations in the past 4 weeks?   No     Immunization questionnaire answers that were yes provider aware and patient is ok'd to get vaccines. .      Patient instructed to remain in clinic for 15 minutes afterwards, and to report any adverse reactions.     Screening performed by Chasidy Gardiner CMA on 7/15/2025 at 8:08 AM.

## 2025-07-15 NOTE — PROGRESS NOTES
"Preventive Care Visit  [facility]  Renetta LEYDI Pierce PA-C, Family Medicine  Jul 15, 2025    Assessment & Plan     ICD-10-CM    1. Routine general medical examination at a health care facility  Z00.00       2. Intermittent asthma, uncomplicated  J45.20 albuterol (PROAIR HFA/PROVENTIL HFA/VENTOLIN HFA) 108 (90 Base) MCG/ACT inhaler      3. Hyperlipidemia LDL goal <100  E78.5 Lipid panel reflex to direct LDL Fasting      4. Screening for lipid disorders  Z13.220 Lipid panel reflex to direct LDL Fasting      5. Screening for diabetes mellitus  Z13.1 Glucose      6. Need for shingles vaccine  Z23 ZOSTER RECOMBINANT ADJUVANTED (SHINGRIX)      7. Need for pneumococcal 20-valent conjugate vaccination  Z23 Pneumococcal 20 Valent Conjugate (PCV20)        Patient has been advised of split billing requirements and indicates understanding: Yes    BMI  Estimated body mass index is 26.9 kg/m  as calculated from the following:    Height as of this encounter: 1.569 m (5' 1.77\").    Weight as of this encounter: 66.2 kg (146 lb).   Weight management plan: Discussed healthy diet and exercise guidelines    Counseling  Appropriate preventive services were addressed with this patient via screening, questionnaire, or discussion as appropriate for fall prevention, nutrition, physical activity, Tobacco-use cessation, social engagement, weight loss and cognition.  Checklist reviewing preventive services available has been given to the patient.  Reviewed patient's diet, addressing concerns and/or questions.   Reviewed preventive health counseling, as reflected in patient instructions  Special attention given to:        Regular exercise       Healthy diet/nutrition       Immunizations  Appropriate vaccinations were ordered.         Colorectal Cancer Screening       (Darlyn)menopause management       Advance Care Planning    - Mild elevation of cholesterol in the past, recommend continued monitoring   - Labs " collected today, await results     - Asthma        Stable with OTC allergy control and PRN albuterol      Reviewed use and side effects, refilled     - Discussed strategies for night sweats, patient declined medication at this time       The patient indicates understanding of these issues and agrees with the plan.    Follow up: 1 year or PRN     Sammy Hugo-NATALY Hummel  [facility] - Caesar Bartlett is a 55 year old, presenting for the following:  Physical        7/15/2025     7:12 AM   Additional Questions   Roomed by Nimco   Accompanied by Self         7/15/2025     7:12 AM   Patient Reported Additional Medications   Patient reports taking the following new medications NA          HPI    Menopausal   - 2 years without period   - Getting hot a lot, waking up every night       - Brother diagnosed with bicuspid aortic valve      20 years ago had heart monitor, had pvcs             Asthma      7/15/2025     6:57 AM   ACT Total Scores   ACT TOTAL SCORE (Goal Greater than or Equal to 20) 21    In the past 12 months, how many times did you visit the emergency room for your asthma without being admitted to the hospital? 0   In the past 12 months, how many times were you hospitalized overnight because of your asthma? 0       Patient-reported     Do you have any of the following symptoms? None of these symptoms (cough/noisy breathing/trouble with breathing)  What makes your asthma/breathing worse?  Smoke, Pollens, Animal dander, and Exercise or sports  Do you want more information about how to use your inhaler? No      12/28/2023     2:42 PM 6/4/2024     6:56 AM 7/15/2025     6:57 AM   ACT Total Scores   ACT TOTAL SCORE (Goal Greater than or Equal to 20) 22 22 21    In the past 12 months, how many times did you visit the emergency room for your asthma without being admitted to the hospital? 0 0 0   In the past 12 months, how many times were you hospitalized overnight because of your  asthma? 0 0 0       Patient-reported        Advance Care Planning  Discussed advance care planning with patient; however, patient declined at this time.        7/15/2025   General Health   How would you rate your overall physical health? Good   Feel stress (tense, anxious, or unable to sleep) Only a little   (!) STRESS CONCERN      7/15/2025   Nutrition   Three or more servings of calcium each day? Yes   Diet: Regular (no restrictions)   How many servings of fruit and vegetables per day? (!) 2-3   How many sweetened beverages each day? 0-1         7/15/2025   Exercise   Days per week of moderate/strenous exercise 5 days   Average minutes spent exercising at this level 30 min         7/15/2025   Social Factors   Frequency of gathering with friends or relatives Once a week   Worry food won't last until get money to buy more No   Food not last or not have enough money for food? No   Do you have housing? (Housing is defined as stable permanent housing and does not include staying outside in a car, in a tent, in an abandoned building, in an overnight shelter, or couch-surfing.) Yes   Are you worried about losing your housing? No   Lack of transportation? No   Unable to get utilities (heat,electricity)? No         7/15/2025   Fall Risk   Fallen 2 or more times in the past year? No   Trouble with walking or balance? No          7/15/2025   Dental   Dentist two times every year? Yes         Today's PHQ-2 Score:       7/15/2025     6:56 AM   PHQ-2 ( 1999 Pf[employer])   Q1: Little interest or pleasure in doing things 0   Q2: Feeling down, depressed or hopeless 0   PHQ-2 Score 0    Q1: Little interest or pleasure in doing things Not at all   Q2: Feeling down, depressed or hopeless Not at all   PHQ-2 Score 0       Patient-reported           7/15/2025   Substance Use   Alcohol more than 3/day or more than 7/wk No   Do you use any other substances recreationally? No     Social History     Tobacco Use    Smoking status: Former      Current packs/day: 0.00     Average packs/day: 0.5 packs/day for 5.0 years (2.5 ttl pk-yrs)     Types: Cigarettes     Start date: 2000     Quit date: 2005     Years since quittin.2    Smokeless tobacco: Never    Tobacco comments:     smoked for intermittently, no longer do.   Vaping Use    Vaping status: Never Used   Substance Use Topics    Alcohol use: Yes     Comment:  a couple of beers weeklly    Drug use: No           10/25/2024   LAST FHS-7 RESULTS   1st degree relative breast or ovarian cancer Yes   Any relative bilateral breast cancer No   Any male have breast cancer No   Any ONE woman have BOTH breast AND ovarian cancer No   Any woman with breast cancer before 50yrs No   2 or more relatives with breast AND/OR ovarian cancer No   2 or more relatives with breast AND/OR bowel cancer No     Mammogram Screening - Mammogram every 1-2 years updated in Health Maintenance based on mutual decision making        7/15/2025   STI Screening   New sexual partner(s) since last STI/HIV test? No     History of abnormal Pap smear: No - age 30- 64 PAP with HPV every 5 years recommended        Latest Ref Rng & Units 2023     8:59 AM 10/9/2018    10:10 AM 10/9/2018    10:00 AM   PAP / HPV   PAP  Negative for Intraepithelial Lesion or Malignancy (NILM)      PAP (Historical)   NIL     HPV 16 DNA Negative Negative   Negative    HPV 18 DNA Negative Negative   Negative    Other HR HPV Negative Negative   Negative      ASCVD Risk   The 10-year ASCVD risk score (Clover SIGALA, et al., 2019) is: 2.2%    Values used to calculate the score:      Age: 55 years      Sex: Female      Is Non- : No      Diabetic: No      Tobacco smoker: No      Systolic Blood Pressure: 128 mmHg      Is BP treated: No      HDL Cholesterol: 59 mg/dL      Total Cholesterol: 234 mg/dL    Reviewed and updated as needed this visit by Provider   Tobacco  Allergies  Meds  Problems  Med Hx  Surg Hx  Fam Hx         "    Past Medical History:   Diagnosis Date    Allergic rhinitis, cause unspecified     Seasonal    Depressive disorder 1995    Don't currently have.    Depressive disorder, not elsewhere classified     PVC (premature ventricular contraction)     RECTAL & ANAL HEMORRHAGE 05/10/2007    Uncomplicated asthma 1993    Unspecified asthma(493.90) 01/01/1995     Past Surgical History:   Procedure Laterality Date    COLONOSCOPY  05/21/2007    Sm internal hemorrhoids, otherwise normal. Repeat age 50.    COLONOSCOPY N/A 11/09/2020    Procedure: COLONOSCOPY WITH POLYPECTOMY;  Surgeon: Melchor Wilkins MD;  Location:  GI    COSMETIC SURGERY      liposuction    ESOPHAGOSCOPY, GASTROSCOPY, DUODENOSCOPY (EGD), COMBINED  10/03/2011    Procedure:COMBINED ESOPHAGOSCOPY, GASTROSCOPY, DUODENOSCOPY (EGD), BIOPSY SINGLE OR MULTIPLE; esophagogastroduodenoscopy with biopsy and dilatation, MULTIPLE BX; Surgeon:CHARY MORAN; Location: GI    ESOPHAGOSCOPY, GASTROSCOPY, DUODENOSCOPY (EGD), DILATATION, COMBINED  10/03/2011    Procedure:COMBINED ESOPHAGOSCOPY, GASTROSCOPY, DUODENOSCOPY (EGD), DILATATION; Surgeon:CHARY MORAN; Location: GI    HC HYSTEROSCOPY W ENDOMETRIAL BX/POLYPECTOMY W/WO D&C  01/19/2007    Hysteroscopy, D&C    HC REMOVAL OF TONSILS,<11 Y/O  01/01/1976    Tonsils <12y.o.     Lab work is in process  Labs reviewed in EPIC  BP Readings from Last 3 Encounters:   07/15/25 128/82   06/04/24 118/80   02/13/24 (!) 147/87    Wt Readings from Last 3 Encounters:   07/15/25 66.2 kg (146 lb)   06/04/24 64.9 kg (143 lb)   02/13/24 65.8 kg (145 lb)                      Review of Systems  Constitutional, neuro, ENT, endocrine, pulmonary, cardiac, gastrointestinal, genitourinary, musculoskeletal, integument and psychiatric systems are negative, except as otherwise noted.     Objective    Exam  /82   Pulse 66   Temp 98.3  F (36.8  C) (Temporal)   Resp 18   Ht 1.569 m (5' 1.77\")   Wt 66.2 kg (146 lb)   LMP  " "(LMP Unknown)   SpO2 96%   BMI 26.90 kg/m     Estimated body mass index is 26.9 kg/m  as calculated from the following:    Height as of this encounter: 1.569 m (5' 1.77\").    Weight as of this encounter: 66.2 kg (146 lb).    Physical Exam  Constitutional:       General: She is not in acute distress.     Appearance: She is well-developed.   HENT:      Right Ear: External ear normal.      Left Ear: External ear normal.      Nose: Nose normal.      Mouth/Throat:      Pharynx: No oropharyngeal exudate.   Eyes:      General:         Right eye: No discharge.         Left eye: No discharge.      Conjunctiva/sclera: Conjunctivae normal.      Pupils: Pupils are equal, round, and reactive to light.   Neck:      Thyroid: No thyromegaly.      Vascular: No JVD.      Trachea: No tracheal deviation.   Cardiovascular:      Rate and Rhythm: Normal rate and regular rhythm.      Heart sounds: Normal heart sounds. No murmur heard.     No friction rub. No gallop.   Pulmonary:      Effort: Pulmonary effort is normal. No respiratory distress.      Breath sounds: Normal breath sounds. No stridor. No wheezing or rales.   Chest:   Breasts:     Breasts are symmetrical.      Right: No inverted nipple, mass, nipple discharge or skin change.      Left: No inverted nipple, mass, nipple discharge or skin change.   Abdominal:      General: Bowel sounds are normal. There is no distension.      Palpations: Abdomen is soft. There is no mass.      Tenderness: There is no abdominal tenderness. There is no guarding or rebound.      Hernia: No hernia is present.   Musculoskeletal:         General: Normal range of motion.      Cervical back: Normal range of motion and neck supple.   Lymphadenopathy:      Cervical: No cervical adenopathy.   Skin:     General: Skin is warm and dry.   Neurological:      Mental Status: She is alert and oriented to person, place, and time.   Psychiatric:         Behavior: Behavior normal.         Thought Content: Thought " content normal.         Judgment: Judgment normal.     Pelvic not indicated         Diagnostics: reviewed in Epic, see orders pending in Epic       Signed Electronically by: Renetta Pierce PA-C

## 2025-07-15 NOTE — PATIENT INSTRUCTIONS
Patient Education     - [place]         Preventive Care Advice   This is general advice given by our system to help you stay healthy. However, your care team may have specific advice just for you. Please talk to your care team about your preventive care needs.  Nutrition  Eat 5 or more servings of fruits and vegetables each day.  Try wheat bread, brown rice and whole grain pasta (instead of white bread, rice, and pasta).  Get enough calcium and vitamin D. Check the label on foods and aim for 100% of the RDA (recommended daily allowance).  Lifestyle  Exercise at least 150 minutes each week  (30 minutes a day, 5 days a week).  Do muscle strengthening activities 2 days a week. These help control your weight and prevent disease.  No smoking.  Wear sunscreen to prevent skin cancer.  Have a dental exam and cleaning every 6 months.  Yearly exams  See your health care team every year to talk about:  Any changes in your health.  Any medicines your care team has prescribed.  Preventive care, family planning, and ways to prevent chronic diseases.  Shots (vaccines)   HPV shots (up to age 26), if you've never had them before.  Hepatitis B shots (up to age 59), if you've never had them before.  COVID-19 shot: Get this shot when it's due.  Flu shot: Get a flu shot every year.  Tetanus shot: Get a tetanus shot every 10 years.  Pneumococcal, hepatitis A, and RSV shots: Ask your care team if you need these based on your risk.  Shingles shot (for age 50 and up)  General health tests  Diabetes screening:  Starting at age 35, Get screened for diabetes at least every 3 years.  If you are younger than age 35, ask your care team if you should be screened for diabetes.  Cholesterol test: At age 39, start having a cholesterol test every 5 years, or more often if advised.  Bone density scan (DEXA): At age 50, ask your care team if you should have this scan for osteoporosis (brittle bones).  Hepatitis C: Get tested at least once in your  life.  STIs (sexually transmitted infections)  Before age 24: Ask your care team if you should be screened for STIs.  After age 24: Get screened for STIs if you're at risk. You are at risk for STIs (including HIV) if:  You are sexually active with more than one person.  You don't use condoms every time.  You or a partner was diagnosed with a sexually transmitted infection.  If you are at risk for HIV, ask about PrEP medicine to prevent HIV.  Get tested for HIV at least once in your life, whether you are at risk for HIV or not.  Cancer screening tests  Cervical cancer screening: If you have a cervix, begin getting regular cervical cancer screening tests starting at age 21.  Breast cancer scan (mammogram): If you've ever had breasts, begin having regular mammograms starting at age 40. This is a scan to check for breast cancer.  Colon cancer screening: It is important to start screening for colon cancer at age 45.  Have a colonoscopy test every 10 years (or more often if you're at risk) Or, ask your provider about stool tests like a FIT test every year or Cologuard test every 3 years.  To learn more about your testing options, visit:   .  For help making a decision, visit:   https://bit.ly/uz78852.  Prostate cancer screening test: If you have a prostate, ask your care team if a prostate cancer screening test (PSA) at age 55 is right for you.  Lung cancer screening: If you are a current or former smoker ages 50 to 80, ask your care team if ongoing lung cancer screenings are right for you.  For informational purposes only. Not to replace the advice of your health care provider. Copyright   2023 [employer]. All rights reserved. Clinically reviewed by the [facility] Transitions Program. [employer] [id number] - REV 01/24.

## (undated) RX ORDER — LIDOCAINE HYDROCHLORIDE 20 MG/ML
INJECTION, SOLUTION EPIDURAL; INFILTRATION; INTRACAUDAL; PERINEURAL
Status: DISPENSED
Start: 2020-11-09

## (undated) RX ORDER — PROPOFOL 10 MG/ML
INJECTION, EMULSION INTRAVENOUS
Status: DISPENSED
Start: 2020-11-09